# Patient Record
Sex: FEMALE | NOT HISPANIC OR LATINO | Employment: FULL TIME | URBAN - METROPOLITAN AREA
[De-identification: names, ages, dates, MRNs, and addresses within clinical notes are randomized per-mention and may not be internally consistent; named-entity substitution may affect disease eponyms.]

---

## 2023-08-09 ENCOUNTER — HOSPITAL ENCOUNTER (OUTPATIENT)
Dept: DATA CONVERSION | Facility: HOSPITAL | Age: 58
Discharge: HOME | End: 2023-08-09

## 2023-08-09 DIAGNOSIS — R73.01 IMPAIRED FASTING GLUCOSE: ICD-10-CM

## 2023-08-09 DIAGNOSIS — I10 ESSENTIAL (PRIMARY) HYPERTENSION: ICD-10-CM

## 2023-08-09 LAB
ANION GAP SERPL CALCULATED.3IONS-SCNC: 11 MMOL/L (ref 0–19)
BUN SERPL-MCNC: 15 MG/DL (ref 8–25)
BUN/CREAT SERPL: 16.7 RATIO (ref 8–21)
CALCIUM SERPL-MCNC: 9.4 MG/DL (ref 8.5–10.4)
CHLORIDE SERPL-SCNC: 104 MMOL/L (ref 97–107)
CO2 SERPL-SCNC: 28 MMOL/L (ref 24–31)
CREAT SERPL-MCNC: 0.9 MG/DL (ref 0.4–1.6)
GFR SERPL CREATININE-BSD FRML MDRD: 74 ML/MIN/1.73 M2
GLUCOSE SERPL-MCNC: 117 MG/DL (ref 65–99)
HBA1C MFR BLD: 6.5 % (ref 4–6)
POTASSIUM SERPL-SCNC: 4.2 MMOL/L (ref 3.4–5.1)
SODIUM SERPL-SCNC: 143 MMOL/L (ref 133–145)

## 2023-09-10 ENCOUNTER — HOSPITAL ENCOUNTER (OUTPATIENT)
Dept: DATA CONVERSION | Facility: HOSPITAL | Age: 58
Discharge: HOME | End: 2023-09-10
Payer: COMMERCIAL

## 2023-09-10 DIAGNOSIS — R22.0 LOCALIZED SWELLING, MASS AND LUMP, HEAD: ICD-10-CM
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
## 2023-09-10 DIAGNOSIS — T78.40XA ALLERGY, UNSPECIFIED, INITIAL ENCOUNTER: ICD-10-CM

## 2023-09-10 DIAGNOSIS — U07.1 COVID-19: ICD-10-CM

## 2023-09-21 RX ORDER — ATORVASTATIN CALCIUM 20 MG/1
20 TABLET, FILM COATED ORAL DAILY PRN
COMMUNITY
Start: 2023-08-15 | End: 2024-04-08 | Stop reason: SDUPTHER

## 2023-09-21 RX ORDER — CITALOPRAM 20 MG/1
20 TABLET, FILM COATED ORAL DAILY
COMMUNITY
Start: 2023-08-15 | End: 2024-03-19

## 2023-09-21 RX ORDER — LISINOPRIL 5 MG/1
5 TABLET ORAL DAILY
COMMUNITY
Start: 2023-07-29 | End: 2023-11-20 | Stop reason: WASHOUT

## 2023-09-21 RX ORDER — METFORMIN HYDROCHLORIDE 500 MG/1
TABLET, EXTENDED RELEASE ORAL
COMMUNITY
Start: 2023-08-21 | End: 2024-03-15

## 2023-09-21 RX ORDER — LOSARTAN POTASSIUM 25 MG/1
TABLET ORAL
COMMUNITY
Start: 2023-09-08 | End: 2023-10-10 | Stop reason: WASHOUT

## 2023-09-21 RX ORDER — ALENDRONATE SODIUM 70 MG/1
70 TABLET ORAL
COMMUNITY
Start: 2023-03-22

## 2023-10-09 DIAGNOSIS — I10 PRIMARY HYPERTENSION: Primary | ICD-10-CM

## 2023-10-09 RX ORDER — LOSARTAN POTASSIUM 25 MG/1
TABLET ORAL
Qty: 30 TABLET | Refills: 0 | Status: CANCELLED | OUTPATIENT
Start: 2023-10-09

## 2023-10-10 RX ORDER — LOSARTAN POTASSIUM 25 MG/1
25 TABLET ORAL DAILY
Qty: 90 TABLET | Refills: 0 | Status: SHIPPED | OUTPATIENT
Start: 2023-10-10 | End: 2024-01-10

## 2023-11-20 ENCOUNTER — OFFICE VISIT (OUTPATIENT)
Dept: PRIMARY CARE | Facility: CLINIC | Age: 58
End: 2023-11-20
Payer: COMMERCIAL

## 2023-11-20 ENCOUNTER — TELEPHONE (OUTPATIENT)
Dept: PRIMARY CARE | Facility: CLINIC | Age: 58
End: 2023-11-20

## 2023-11-20 ENCOUNTER — LAB (OUTPATIENT)
Dept: LAB | Facility: LAB | Age: 58
End: 2023-11-20
Payer: COMMERCIAL

## 2023-11-20 VITALS
HEART RATE: 91 BPM | DIASTOLIC BLOOD PRESSURE: 90 MMHG | WEIGHT: 143 LBS | OXYGEN SATURATION: 98 % | SYSTOLIC BLOOD PRESSURE: 140 MMHG | BODY MASS INDEX: 24.55 KG/M2

## 2023-11-20 DIAGNOSIS — R53.83 OTHER FATIGUE: Primary | ICD-10-CM

## 2023-11-20 DIAGNOSIS — M25.542 ARTHRALGIA OF BOTH HANDS: ICD-10-CM

## 2023-11-20 DIAGNOSIS — R53.83 OTHER FATIGUE: ICD-10-CM

## 2023-11-20 DIAGNOSIS — M25.541 ARTHRALGIA OF BOTH HANDS: ICD-10-CM

## 2023-11-20 LAB
ALBUMIN SERPL-MCNC: 4.7 G/DL (ref 3.5–5)
ALP BLD-CCNC: 88 U/L (ref 35–125)
ALT SERPL-CCNC: 19 U/L (ref 5–40)
ANION GAP SERPL CALC-SCNC: 15 MMOL/L
AST SERPL-CCNC: 19 U/L (ref 5–40)
BASOPHILS # BLD AUTO: 0.05 X10*3/UL (ref 0–0.1)
BASOPHILS NFR BLD AUTO: 0.7 %
BILIRUB SERPL-MCNC: 1.2 MG/DL (ref 0.1–1.2)
BUN SERPL-MCNC: 14 MG/DL (ref 8–25)
CALCIUM SERPL-MCNC: 9.7 MG/DL (ref 8.5–10.4)
CHLORIDE SERPL-SCNC: 104 MMOL/L (ref 97–107)
CO2 SERPL-SCNC: 26 MMOL/L (ref 24–31)
CREAT SERPL-MCNC: 0.9 MG/DL (ref 0.4–1.6)
EOSINOPHIL # BLD AUTO: 0.07 X10*3/UL (ref 0–0.7)
EOSINOPHIL NFR BLD AUTO: 1 %
ERYTHROCYTE [DISTWIDTH] IN BLOOD BY AUTOMATED COUNT: 13.1 % (ref 11.5–14.5)
GFR SERPL CREATININE-BSD FRML MDRD: 74 ML/MIN/1.73M*2
GLUCOSE SERPL-MCNC: 103 MG/DL (ref 65–99)
HCT VFR BLD AUTO: 39.9 % (ref 36–46)
HGB BLD-MCNC: 13.1 G/DL (ref 12–16)
IMM GRANULOCYTES # BLD AUTO: 0.03 X10*3/UL (ref 0–0.7)
IMM GRANULOCYTES NFR BLD AUTO: 0.4 % (ref 0–0.9)
LYMPHOCYTES # BLD AUTO: 2.31 X10*3/UL (ref 1.2–4.8)
LYMPHOCYTES NFR BLD AUTO: 31.6 %
MCH RBC QN AUTO: 29.5 PG (ref 26–34)
MCHC RBC AUTO-ENTMCNC: 32.8 G/DL (ref 32–36)
MCV RBC AUTO: 90 FL (ref 80–100)
MONOCYTES # BLD AUTO: 0.4 X10*3/UL (ref 0.1–1)
MONOCYTES NFR BLD AUTO: 5.5 %
NEUTROPHILS # BLD AUTO: 4.46 X10*3/UL (ref 1.2–7.7)
NEUTROPHILS NFR BLD AUTO: 60.8 %
NRBC BLD-RTO: 0 /100 WBCS (ref 0–0)
PLATELET # BLD AUTO: 310 X10*3/UL (ref 150–450)
POTASSIUM SERPL-SCNC: 4.1 MMOL/L (ref 3.4–5.1)
PROT SERPL-MCNC: 6.7 G/DL (ref 5.9–7.9)
RBC # BLD AUTO: 4.44 X10*6/UL (ref 4–5.2)
SODIUM SERPL-SCNC: 145 MMOL/L (ref 133–145)
TSH SERPL DL<=0.05 MIU/L-ACNC: 3.25 MIU/L (ref 0.27–4.2)
VIT B12 SERPL-MCNC: 514 PG/ML (ref 211–946)
WBC # BLD AUTO: 7.3 X10*3/UL (ref 4.4–11.3)

## 2023-11-20 PROCEDURE — 36415 COLL VENOUS BLD VENIPUNCTURE: CPT

## 2023-11-20 PROCEDURE — 82607 VITAMIN B-12: CPT

## 2023-11-20 PROCEDURE — 80053 COMPREHEN METABOLIC PANEL: CPT

## 2023-11-20 PROCEDURE — 84443 ASSAY THYROID STIM HORMONE: CPT

## 2023-11-20 PROCEDURE — 85025 COMPLETE CBC W/AUTO DIFF WBC: CPT

## 2023-11-20 RX ORDER — CALCIUM ACETATE 667 MG/1
667 CAPSULE ORAL 3 TIMES DAILY
COMMUNITY

## 2023-11-20 RX ORDER — METHYLPREDNISOLONE 4 MG/1
TABLET ORAL
Qty: 21 TABLET | Refills: 0 | Status: SHIPPED | OUTPATIENT
Start: 2023-11-20 | End: 2023-11-21 | Stop reason: SDUPTHER

## 2023-11-20 ASSESSMENT — PAIN SCALES - GENERAL: PAINLEVEL: 10-WORST PAIN EVER

## 2023-11-20 ASSESSMENT — PATIENT HEALTH QUESTIONNAIRE - PHQ9
2. FEELING DOWN, DEPRESSED OR HOPELESS: NOT AT ALL
SUM OF ALL RESPONSES TO PHQ9 QUESTIONS 1 AND 2: 0
1. LITTLE INTEREST OR PLEASURE IN DOING THINGS: NOT AT ALL

## 2023-11-20 ASSESSMENT — COLUMBIA-SUICIDE SEVERITY RATING SCALE - C-SSRS
6. HAVE YOU EVER DONE ANYTHING, STARTED TO DO ANYTHING, OR PREPARED TO DO ANYTHING TO END YOUR LIFE?: NO
1. IN THE PAST MONTH, HAVE YOU WISHED YOU WERE DEAD OR WISHED YOU COULD GO TO SLEEP AND NOT WAKE UP?: NO
2. HAVE YOU ACTUALLY HAD ANY THOUGHTS OF KILLING YOURSELF?: NO

## 2023-11-20 NOTE — PROGRESS NOTES
Subjective   Patient ID: Mariia Rodarte is a 58 y.o. female who presents for Medication Reaction (Pt would like to discuss her metformin and interaction to plaxovid, pt had swelling. Pt has restarted metformin, pt has noticed pain in hands and feet. ).    HPI  59 yo female here for DM follow up  DM  Started metformin  Stopped it due to paxlovid rxn  When she restarted it, feeling rxn from it  Is ok now doing 500 mg in morning, 1000 mg at night  2. Covid a few months ago  Started paxlovid  Had throat swelling  Had paxlovid in past and didn't react  3. Long covid  Covid in September  B/L joint pain in hands, hips, knees  Fatigue  4. Fatigue  Started after covid infection  5. Elevated BP  Didn't take medication yet this morning  On lisinopril 25 mg    Review of Systems  All pertinent positive symptoms are included in the history of present illness.    All other systems have been reviewed and are negative and noncontributory to this patient's current ailments.     Allergies   Allergen Reactions    Paxlovid [Nirmatrelvir-Ritonavir] Swelling        Immunization History   Administered Date(s) Administered    Pfizer Purple Cap SARS-CoV-2 03/21/2021, 04/11/2021, 12/27/2021       Objective   Vitals:    11/20/23 0946 11/20/23 1207   BP: (!) 142/92 140/90   Pulse: 91    SpO2: 98%    Weight: 64.9 kg (143 lb)        Physical Exam  CONSTITUTIONAL - well nourished, well developed, looks like stated age, in no acute distress  SKIN - normal skin color and pigmentation, normal skin turgor without rash, lesions, or nodules visualized  HEAD - no trauma, normocephalic  EYES - extraocular muscles are intact  ENT - atraumatic  NECK - no neck mass was observed  LUNGS - CTA B/L  CARDIAC - regular rate and regular rhythm, no skipped beats, no murmur appreciated  ABDOMEN - no organomegaly, soft, nontender, nondistended, no guarding/rebound/rigidity  EXTREMITIES - no edema, no deformities  MSK - moves limbs equally  NEUROLOGICAL - normal gait,  normal balance, alert, oriented and no focal signs  PSYCHIATRIC - alert, pleasant and cordial, age-appropriate  IMMUNOLOGIC - no cervical lymphadenopathy     Assessment/Plan   59 yo female here for DM follow up  DM  Start metformin 500 mg in morning, 1000 mg at night  A1C ordered  2. Covid a few months ago  Do not take paxlovid in future  3. Fatigue/joint pain, concern for Long covid  Referral to long covid clinic  4. Fatigue  Lab work  5. Joint pain  Start medrol dose pack  6. Elevated BP  Didn't take medication yet this morning  Continue lisinopril 25 mg  Log Bps at home and return in 1 month if Bps over 130/85    RTC in 3 months for DM follow up

## 2023-11-21 DIAGNOSIS — M25.541 ARTHRALGIA OF BOTH HANDS: ICD-10-CM

## 2023-11-21 DIAGNOSIS — M25.542 ARTHRALGIA OF BOTH HANDS: ICD-10-CM

## 2023-11-21 RX ORDER — METHYLPREDNISOLONE 4 MG/1
TABLET ORAL
Qty: 21 TABLET | Refills: 0 | Status: SHIPPED | OUTPATIENT
Start: 2023-11-21 | End: 2023-11-28

## 2023-11-21 NOTE — TELEPHONE ENCOUNTER
Pt called stating Medrol Dospak was sent to incorrect pharmacy. Needs to go to  in [place]. Pharmacy added

## 2023-11-29 ENCOUNTER — TELEPHONE (OUTPATIENT)
Dept: PRIMARY CARE | Facility: CLINIC | Age: 58
End: 2023-11-29
Payer: COMMERCIAL

## 2023-11-29 DIAGNOSIS — J06.9 UPPER RESPIRATORY TRACT INFECTION, UNSPECIFIED TYPE: Primary | ICD-10-CM

## 2023-11-29 NOTE — TELEPHONE ENCOUNTER
Pt called stating she was seen by  on 11/20 for sick sx and was given medro dose verna. Pt states she is coughing up a lot of phlegm and is discolored. Pt wants to know if there is something over the counter you can recommend such as sudafed?

## 2023-11-30 RX ORDER — AMOXICILLIN AND CLAVULANATE POTASSIUM 875; 125 MG/1; MG/1
875 TABLET, FILM COATED ORAL 2 TIMES DAILY
Qty: 14 TABLET | Refills: 0 | Status: SHIPPED | OUTPATIENT
Start: 2023-11-30 | End: 2023-12-07

## 2023-11-30 NOTE — TELEPHONE ENCOUNTER
7 days of Augmentin sent in. Take with food, and start daily probiotic to avoid GI upset. Schedule follow-up appointment in office if not approving on antibiotic.

## 2023-11-30 NOTE — TELEPHONE ENCOUNTER
Pt called back and states that she is feeling worse. Pt did as recommended and has been taking the mucinex 600mg over the counter. Pt states that she is still having a lot of coughing with phelgm, that it making her have chest pressure. Pt would like to know if she could get antibotics. Pt last seen on 11/20/2023.

## 2023-12-08 ENCOUNTER — ANCILLARY PROCEDURE (OUTPATIENT)
Dept: RADIOLOGY | Facility: CLINIC | Age: 58
End: 2023-12-08
Payer: COMMERCIAL

## 2023-12-08 ENCOUNTER — OFFICE VISIT (OUTPATIENT)
Dept: PRIMARY CARE | Facility: CLINIC | Age: 58
End: 2023-12-08
Payer: COMMERCIAL

## 2023-12-08 VITALS
BODY MASS INDEX: 25.13 KG/M2 | HEART RATE: 79 BPM | OXYGEN SATURATION: 98 % | DIASTOLIC BLOOD PRESSURE: 84 MMHG | SYSTOLIC BLOOD PRESSURE: 126 MMHG | WEIGHT: 146.4 LBS

## 2023-12-08 DIAGNOSIS — R05.8 PRODUCTIVE COUGH: Primary | ICD-10-CM

## 2023-12-08 DIAGNOSIS — R05.8 PRODUCTIVE COUGH: ICD-10-CM

## 2023-12-08 PROCEDURE — 71046 X-RAY EXAM CHEST 2 VIEWS: CPT | Mod: FY

## 2023-12-08 PROCEDURE — 99213 OFFICE O/P EST LOW 20 MIN: CPT | Performed by: PHYSICIAN ASSISTANT

## 2023-12-08 PROCEDURE — 1036F TOBACCO NON-USER: CPT | Performed by: PHYSICIAN ASSISTANT

## 2023-12-08 RX ORDER — METHYLPREDNISOLONE 4 MG/1
TABLET ORAL
Qty: 21 TABLET | Refills: 0 | Status: SHIPPED | OUTPATIENT
Start: 2023-12-08 | End: 2023-12-15

## 2023-12-08 ASSESSMENT — ENCOUNTER SYMPTOMS
FATIGUE: 0
SINUS PRESSURE: 1
HEADACHES: 1
FEVER: 0
WHEEZING: 0
COUGH: 1
SORE THROAT: 0
SINUS PAIN: 1
SHORTNESS OF BREATH: 0
MYALGIAS: 0
RHINORRHEA: 1

## 2023-12-08 ASSESSMENT — COLUMBIA-SUICIDE SEVERITY RATING SCALE - C-SSRS
6. HAVE YOU EVER DONE ANYTHING, STARTED TO DO ANYTHING, OR PREPARED TO DO ANYTHING TO END YOUR LIFE?: NO
2. HAVE YOU ACTUALLY HAD ANY THOUGHTS OF KILLING YOURSELF?: NO
1. IN THE PAST MONTH, HAVE YOU WISHED YOU WERE DEAD OR WISHED YOU COULD GO TO SLEEP AND NOT WAKE UP?: NO

## 2023-12-08 ASSESSMENT — PATIENT HEALTH QUESTIONNAIRE - PHQ9
2. FEELING DOWN, DEPRESSED OR HOPELESS: NOT AT ALL
1. LITTLE INTEREST OR PLEASURE IN DOING THINGS: NOT AT ALL
SUM OF ALL RESPONSES TO PHQ9 QUESTIONS 1 AND 2: 0

## 2023-12-08 ASSESSMENT — PAIN SCALES - GENERAL: PAINLEVEL: 8

## 2023-12-08 NOTE — PROGRESS NOTES
Subjective   Patient ID: Mariia Rodarte is a 58 y.o. female who presents for Nasal Congestion (Pt is here for continued congestion, coughing up green mucus, and stuffy nose, for 15 days  /nr /Pt did a covid test on Monday and Wednesday, which was negative ).    HPI     Sxs ongoing x 15 days. Started w/voice hoarseness and cold sxs. Progressively worsening. Was evaluated here in office and advised to trial mucinex which didn't help. She called back and was given rx for augmentin. On last day today, does not feel that it helped.    Still coughing up green phlegm. Denies dyspnea. Nonsmoker.      Review of Systems   Constitutional:  Negative for fatigue and fever.   HENT:  Positive for congestion, rhinorrhea, sinus pressure and sinus pain. Negative for ear pain and sore throat.    Respiratory:  Positive for cough (productive, yellow-greenish mucus). Negative for shortness of breath and wheezing.    Cardiovascular:  Negative for chest pain.   Musculoskeletal:  Negative for myalgias.   Skin:  Negative for rash.   Neurological:  Positive for headaches.        Objective   /84   Pulse 79   Wt 66.4 kg (146 lb 6.4 oz)   SpO2 98%   BMI 25.13 kg/m²     Physical Exam  Constitutional:       Appearance: Normal appearance.   HENT:      Head: Normocephalic and atraumatic.   Eyes:      Conjunctiva/sclera: Conjunctivae normal.   Cardiovascular:      Rate and Rhythm: Normal rate and regular rhythm.   Pulmonary:      Effort: Pulmonary effort is normal.      Breath sounds: Normal breath sounds. No wheezing.   Musculoskeletal:      Cervical back: Normal range of motion and neck supple.   Skin:     General: Skin is warm and dry.      Findings: No rash.   Neurological:      General: No focal deficit present.      Mental Status: She is alert.         Assessment/Plan   Problem List Items Addressed This Visit    None  Visit Diagnoses         Codes    Productive cough    -  Primary R05.8    Relevant Medications    methylPREDNISolone  (Medrol Dospak) 4 mg tablets    Other Relevant Orders    XR chest 2 views

## 2023-12-12 ENCOUNTER — TELEPHONE (OUTPATIENT)
Dept: PRIMARY CARE | Facility: CLINIC | Age: 58
End: 2023-12-12
Payer: COMMERCIAL

## 2023-12-12 NOTE — TELEPHONE ENCOUNTER
It means she has some mild arthritis in her spine. Nothing to do for it if it is not bothering her. Happy to hear she is better on the steroid.

## 2023-12-12 NOTE — TELEPHONE ENCOUNTER
Pt called stating that she saw the xray results. Pt had a question about it, pt would like to know what it means when it showed that she had degenerative changes of the thoracic spine? However, pt does feel much better being on the steroid pack. Pt has noticed improvement in there congestion and cough.

## 2024-01-09 DIAGNOSIS — I10 PRIMARY HYPERTENSION: ICD-10-CM

## 2024-01-10 RX ORDER — LOSARTAN POTASSIUM 25 MG/1
25 TABLET ORAL DAILY
Qty: 90 TABLET | Refills: 1 | Status: SHIPPED | OUTPATIENT
Start: 2024-01-10 | End: 2024-04-08 | Stop reason: SDUPTHER

## 2024-02-16 PROBLEM — G43.011 INTRACTABLE MIGRAINE WITHOUT AURA AND WITH STATUS MIGRAINOSUS: Status: ACTIVE | Noted: 2024-02-16

## 2024-02-16 PROBLEM — E11.9 TYPE 2 DIABETES MELLITUS WITHOUT COMPLICATION, WITHOUT LONG-TERM CURRENT USE OF INSULIN (MULTI): Status: ACTIVE | Noted: 2024-02-16

## 2024-02-16 PROBLEM — E78.5 HYPERLIPIDEMIA: Status: ACTIVE | Noted: 2024-02-16

## 2024-02-16 PROBLEM — M19.031 PRIMARY OSTEOARTHRITIS OF RIGHT WRIST: Status: ACTIVE | Noted: 2024-02-16

## 2024-02-16 PROBLEM — Z90.710 HISTORY OF HYSTERECTOMY: Status: ACTIVE | Noted: 2024-02-16

## 2024-02-16 PROBLEM — F41.1 GAD (GENERALIZED ANXIETY DISORDER): Status: ACTIVE | Noted: 2024-02-16

## 2024-02-16 PROBLEM — I10 ESSENTIAL HYPERTENSION: Status: ACTIVE | Noted: 2024-02-16

## 2024-02-16 PROBLEM — E55.9 VITAMIN D DEFICIENCY: Status: ACTIVE | Noted: 2024-02-16

## 2024-02-16 PROBLEM — N95.1 FEMALE CLIMACTERIC STATE: Status: ACTIVE | Noted: 2024-02-16

## 2024-03-14 DIAGNOSIS — E11.9 TYPE 2 DIABETES MELLITUS WITHOUT COMPLICATION, WITHOUT LONG-TERM CURRENT USE OF INSULIN (MULTI): Primary | ICD-10-CM

## 2024-03-15 RX ORDER — METFORMIN HYDROCHLORIDE 500 MG/1
TABLET, EXTENDED RELEASE ORAL
Qty: 360 TABLET | Refills: 1 | Status: SHIPPED | OUTPATIENT
Start: 2024-03-15

## 2024-03-19 DIAGNOSIS — F41.1 GAD (GENERALIZED ANXIETY DISORDER): Primary | ICD-10-CM

## 2024-03-19 RX ORDER — CITALOPRAM 20 MG/1
20 TABLET, FILM COATED ORAL DAILY
Qty: 90 TABLET | Refills: 1 | Status: SHIPPED | OUTPATIENT
Start: 2024-03-19

## 2024-04-08 ENCOUNTER — LAB (OUTPATIENT)
Dept: LAB | Facility: LAB | Age: 59
End: 2024-04-08
Payer: COMMERCIAL

## 2024-04-08 ENCOUNTER — OFFICE VISIT (OUTPATIENT)
Dept: PRIMARY CARE | Facility: CLINIC | Age: 59
End: 2024-04-08
Payer: COMMERCIAL

## 2024-04-08 ENCOUNTER — HOSPITAL ENCOUNTER (OUTPATIENT)
Dept: RADIOLOGY | Facility: CLINIC | Age: 59
Discharge: HOME | End: 2024-04-08
Payer: COMMERCIAL

## 2024-04-08 VITALS
OXYGEN SATURATION: 98 % | BODY MASS INDEX: 24.89 KG/M2 | HEART RATE: 94 BPM | WEIGHT: 145 LBS | DIASTOLIC BLOOD PRESSURE: 90 MMHG | SYSTOLIC BLOOD PRESSURE: 130 MMHG

## 2024-04-08 DIAGNOSIS — E11.9 TYPE 2 DIABETES MELLITUS WITHOUT COMPLICATION, WITHOUT LONG-TERM CURRENT USE OF INSULIN (MULTI): ICD-10-CM

## 2024-04-08 DIAGNOSIS — M54.2 NECK PAIN: ICD-10-CM

## 2024-04-08 DIAGNOSIS — I10 PRIMARY HYPERTENSION: ICD-10-CM

## 2024-04-08 DIAGNOSIS — Z12.31 BREAST CANCER SCREENING BY MAMMOGRAM: Primary | ICD-10-CM

## 2024-04-08 DIAGNOSIS — R41.3 SHORT-TERM MEMORY LOSS: ICD-10-CM

## 2024-04-08 DIAGNOSIS — E78.2 MIXED HYPERLIPIDEMIA: Primary | ICD-10-CM

## 2024-04-08 LAB
ALBUMIN SERPL-MCNC: 4.7 G/DL (ref 3.5–5)
ALP BLD-CCNC: 88 U/L (ref 35–125)
ALT SERPL-CCNC: 18 U/L (ref 5–40)
ANION GAP SERPL CALC-SCNC: 12 MMOL/L
AST SERPL-CCNC: 19 U/L (ref 5–40)
BILIRUB SERPL-MCNC: 0.9 MG/DL (ref 0.1–1.2)
BUN SERPL-MCNC: 14 MG/DL (ref 8–25)
CALCIUM SERPL-MCNC: 9.8 MG/DL (ref 8.5–10.4)
CHLORIDE SERPL-SCNC: 104 MMOL/L (ref 97–107)
CHOLEST SERPL-MCNC: 153 MG/DL (ref 133–200)
CHOLEST/HDLC SERPL: 2.2 {RATIO}
CO2 SERPL-SCNC: 27 MMOL/L (ref 24–31)
CREAT SERPL-MCNC: 0.8 MG/DL (ref 0.4–1.6)
CREAT UR-MCNC: 36.5 MG/DL
EGFRCR SERPLBLD CKD-EPI 2021: 84 ML/MIN/1.73M*2
GLUCOSE SERPL-MCNC: 113 MG/DL (ref 65–99)
HDLC SERPL-MCNC: 70 MG/DL
LDLC SERPL CALC-MCNC: 57 MG/DL (ref 65–130)
MICROALBUMIN UR-MCNC: <12 MG/L (ref 0–23)
MICROALBUMIN/CREAT UR: NORMAL MG/G{CREAT}
POC HEMOGLOBIN A1C: 5.7 % (ref 4.2–6.5)
POTASSIUM SERPL-SCNC: 4.3 MMOL/L (ref 3.4–5.1)
PROT SERPL-MCNC: 6.8 G/DL (ref 5.9–7.9)
SODIUM SERPL-SCNC: 143 MMOL/L (ref 133–145)
TRIGL SERPL-MCNC: 131 MG/DL (ref 40–150)

## 2024-04-08 PROCEDURE — 80061 LIPID PANEL: CPT

## 2024-04-08 PROCEDURE — 72050 X-RAY EXAM NECK SPINE 4/5VWS: CPT

## 2024-04-08 PROCEDURE — 3075F SYST BP GE 130 - 139MM HG: CPT | Performed by: PHYSICIAN ASSISTANT

## 2024-04-08 PROCEDURE — 83036 HEMOGLOBIN GLYCOSYLATED A1C: CPT | Performed by: PHYSICIAN ASSISTANT

## 2024-04-08 PROCEDURE — 82043 UR ALBUMIN QUANTITATIVE: CPT

## 2024-04-08 PROCEDURE — 4010F ACE/ARB THERAPY RXD/TAKEN: CPT | Performed by: PHYSICIAN ASSISTANT

## 2024-04-08 PROCEDURE — 1036F TOBACCO NON-USER: CPT | Performed by: PHYSICIAN ASSISTANT

## 2024-04-08 PROCEDURE — 36415 COLL VENOUS BLD VENIPUNCTURE: CPT

## 2024-04-08 PROCEDURE — 3079F DIAST BP 80-89 MM HG: CPT | Performed by: PHYSICIAN ASSISTANT

## 2024-04-08 PROCEDURE — 99214 OFFICE O/P EST MOD 30 MIN: CPT | Performed by: PHYSICIAN ASSISTANT

## 2024-04-08 PROCEDURE — 72050 X-RAY EXAM NECK SPINE 4/5VWS: CPT | Performed by: RADIOLOGY

## 2024-04-08 PROCEDURE — 82570 ASSAY OF URINE CREATININE: CPT

## 2024-04-08 PROCEDURE — 80053 COMPREHEN METABOLIC PANEL: CPT

## 2024-04-08 RX ORDER — DEXTROSE 4 G
1 TABLET,CHEWABLE ORAL DAILY
Qty: 1 EACH | Refills: 0 | Status: SHIPPED | OUTPATIENT
Start: 2024-04-08

## 2024-04-08 RX ORDER — LOSARTAN POTASSIUM 25 MG/1
25 TABLET ORAL DAILY
Qty: 90 TABLET | Refills: 1 | Status: SHIPPED | OUTPATIENT
Start: 2024-04-08

## 2024-04-08 RX ORDER — LANCETS 26 GAUGE
1 EACH MISCELLANEOUS DAILY
Qty: 100 EACH | Refills: 1 | Status: SHIPPED | OUTPATIENT
Start: 2024-04-08 | End: 2024-10-25

## 2024-04-08 RX ORDER — ATORVASTATIN CALCIUM 20 MG/1
20 TABLET, FILM COATED ORAL DAILY PRN
Qty: 90 TABLET | Refills: 1 | Status: SHIPPED | OUTPATIENT
Start: 2024-04-08

## 2024-04-08 RX ORDER — BLOOD SUGAR DIAGNOSTIC
1 STRIP MISCELLANEOUS DAILY
Qty: 100 STRIP | Refills: 1 | Status: SHIPPED | OUTPATIENT
Start: 2024-04-08 | End: 2024-07-07

## 2024-04-08 ASSESSMENT — COLUMBIA-SUICIDE SEVERITY RATING SCALE - C-SSRS
2. HAVE YOU ACTUALLY HAD ANY THOUGHTS OF KILLING YOURSELF?: NO
1. IN THE PAST MONTH, HAVE YOU WISHED YOU WERE DEAD OR WISHED YOU COULD GO TO SLEEP AND NOT WAKE UP?: NO
6. HAVE YOU EVER DONE ANYTHING, STARTED TO DO ANYTHING, OR PREPARED TO DO ANYTHING TO END YOUR LIFE?: NO

## 2024-04-08 ASSESSMENT — ENCOUNTER SYMPTOMS
SHORTNESS OF BREATH: 0
FATIGUE: 0
ABDOMINAL PAIN: 0
FEVER: 0

## 2024-04-08 ASSESSMENT — PAIN SCALES - GENERAL: PAINLEVEL: 0-NO PAIN

## 2024-04-08 NOTE — PROGRESS NOTES
Subjective   Patient ID: Mariia Rodarte is a 59 y.o. female who presents for A1c check  (Pt is here for A1c check / nr), Breast Cancer Screening (Pt would like a mammogram order ), Medication Reaction (Pt would like to discuss getting a meter to check her blood sugars, due to having some dizziness / nr), and Memory Loss (Pt has noticed having some short term memory loss, for the last month / nr).    HPI:   DM - A1c 6.5 --> 5.7. managed w/metformin 2000mg daily. +statin, ARB. PNA vaccine 3/2022. Occasionally gets sxs of lightheadedness, requesting glucometer. Due for eye exam - requesting referral.    Short term memory loss - worsening over the past month. Has difficulty remembering where she is/what she's doing. Lasts 1-2 minutes at a time. +FamHx dementia.    Neck pain - ongoing x 6mos, worsening. Admits to decreased ROM. Denies paresthesias.      Review of Systems   Constitutional:  Negative for fatigue and fever.   Respiratory:  Negative for shortness of breath.    Cardiovascular:  Negative for chest pain.   Gastrointestinal:  Negative for abdominal pain.   Skin:  Negative for rash.       Objective   /90   Pulse 94   Wt 65.8 kg (145 lb)   SpO2 98%   BMI 24.89 kg/m²     Physical Exam  Constitutional:       Appearance: Normal appearance.   HENT:      Head: Normocephalic and atraumatic.   Eyes:      Extraocular Movements: Extraocular movements intact.      Conjunctiva/sclera: Conjunctivae normal.   Cardiovascular:      Rate and Rhythm: Normal rate and regular rhythm.      Pulses:           Dorsalis pedis pulses are 2+ on the right side and 2+ on the left side.      Heart sounds: Normal heart sounds.   Pulmonary:      Effort: Pulmonary effort is normal.      Breath sounds: Normal breath sounds. No wheezing or rhonchi.   Musculoskeletal:      Cervical back: Normal range of motion and neck supple.   Feet:      Right foot:      Protective Sensation: 4 sites tested.  4 sites sensed.      Skin integrity: Skin  integrity normal.      Toenail Condition: Right toenails are normal.      Left foot:      Protective Sensation: 4 sites tested.  4 sites sensed.      Skin integrity: Skin integrity normal.      Toenail Condition: Left toenails are normal.   Skin:     General: Skin is warm.      Findings: No rash.   Neurological:      General: No focal deficit present.      Mental Status: She is alert.         Assessment/Plan   Problem List Items Addressed This Visit             ICD-10-CM    Type 2 diabetes mellitus without complication, without long-term current use of insulin (CMS/[facility]) E11.9    Relevant Medications    Autolet (Accu-Chek FastClix Lancing Dev) lancing device    blood-glucose meter (Accu-Chek Guide Glucose Meter) misc    blood sugar diagnostic (Accu-Chek Guide test strips) strip    Other Relevant Orders    Referral to Ophthalmology    Lipid Panel    Comprehensive Metabolic Panel    Albumin , Urine Random    POCT glycosylated hemoglobin (Hb A1C) manually resulted (Completed)     Other Visit Diagnoses         Codes    Breast cancer screening by mammogram    -  Primary Z12.31    Relevant Orders    BI mammo bilateral screening tomosynthesis    Short-term memory loss     R41.3    Relevant Orders    Referral to Neurology    Neck pain     M54.2    Relevant Orders    XR cervical spine complete 4-5 views          >30 mins spent on pt encounter

## 2024-04-17 ENCOUNTER — HOSPITAL ENCOUNTER (OUTPATIENT)
Dept: RADIOLOGY | Facility: CLINIC | Age: 59
Discharge: HOME | End: 2024-04-17
Payer: COMMERCIAL

## 2024-04-17 VITALS — BODY MASS INDEX: 24.41 KG/M2 | WEIGHT: 143 LBS | HEIGHT: 64 IN

## 2024-04-17 DIAGNOSIS — Z12.31 BREAST CANCER SCREENING BY MAMMOGRAM: ICD-10-CM

## 2024-04-17 PROCEDURE — 77063 BREAST TOMOSYNTHESIS BI: CPT

## 2024-04-17 PROCEDURE — 77067 SCR MAMMO BI INCL CAD: CPT | Performed by: STUDENT IN AN ORGANIZED HEALTH CARE EDUCATION/TRAINING PROGRAM

## 2024-04-17 PROCEDURE — 77063 BREAST TOMOSYNTHESIS BI: CPT | Performed by: STUDENT IN AN ORGANIZED HEALTH CARE EDUCATION/TRAINING PROGRAM

## 2024-05-20 ENCOUNTER — OFFICE VISIT (OUTPATIENT)
Dept: PRIMARY CARE | Facility: CLINIC | Age: 59
End: 2024-05-20
Payer: COMMERCIAL

## 2024-05-20 VITALS
WEIGHT: 142 LBS | HEART RATE: 108 BPM | SYSTOLIC BLOOD PRESSURE: 134 MMHG | DIASTOLIC BLOOD PRESSURE: 86 MMHG | HEIGHT: 64 IN | BODY MASS INDEX: 24.24 KG/M2 | OXYGEN SATURATION: 97 %

## 2024-05-20 DIAGNOSIS — M70.62 GREATER TROCHANTERIC BURSITIS OF LEFT HIP: Primary | ICD-10-CM

## 2024-05-20 DIAGNOSIS — M54.42 ACUTE LEFT-SIDED LOW BACK PAIN WITH LEFT-SIDED SCIATICA: ICD-10-CM

## 2024-05-20 DIAGNOSIS — M76.32 ILIOTIBIAL BAND SYNDROME, LEFT: ICD-10-CM

## 2024-05-20 PROCEDURE — 99213 OFFICE O/P EST LOW 20 MIN: CPT

## 2024-05-20 PROCEDURE — 3079F DIAST BP 80-89 MM HG: CPT

## 2024-05-20 PROCEDURE — 3075F SYST BP GE 130 - 139MM HG: CPT

## 2024-05-20 PROCEDURE — 1036F TOBACCO NON-USER: CPT

## 2024-05-20 PROCEDURE — 3062F POS MACROALBUMINURIA REV: CPT

## 2024-05-20 PROCEDURE — 3048F LDL-C <100 MG/DL: CPT

## 2024-05-20 PROCEDURE — 4010F ACE/ARB THERAPY RXD/TAKEN: CPT

## 2024-05-20 RX ORDER — METHYLPREDNISOLONE 4 MG/1
TABLET ORAL
Qty: 21 TABLET | Refills: 0 | Status: SHIPPED | OUTPATIENT
Start: 2024-05-20 | End: 2024-05-27

## 2024-05-20 ASSESSMENT — ENCOUNTER SYMPTOMS
NUMBNESS: 1
BACK PAIN: 1
FEVER: 0
ARTHRALGIAS: 1
CHILLS: 0

## 2024-05-20 ASSESSMENT — PAIN SCALES - GENERAL: PAINLEVEL: 8

## 2024-05-20 NOTE — PROGRESS NOTES
"Subjective   Patient ID: Mariia Rodarte is a 59 y.o. female who presents for Hip Pain (Pt c/o lt hip and leg pain for over a month /la).    Left outer hip pain 1-2 montsh progressively worsening. Radiates into back of thigh, feels like \"constant munira horse\" and also \"dull painful ache in bone.\" Patient exacerbated with sitting for long periods, walking, and sleeping on left side, crossing left leg. Associated low back pain, also occasional has radiation and numbness at back of ankle. Patient has been taking Tylenol which takes \"edge off\" which takes pain temporarily.     Denies trauma, changes of physical activity.                Review of Systems   Constitutional:  Negative for chills and fever.   Musculoskeletal:  Positive for arthralgias, back pain and gait problem.   Neurological:  Positive for numbness.       Objective   /86   Pulse 108   Ht 1.626 m (5' 4\")   Wt 64.4 kg (142 lb)   SpO2 97%   BMI 24.37 kg/m²     Physical Exam  Constitutional:       Appearance: Normal appearance.   Pulmonary:      Effort: Pulmonary effort is normal.   Musculoskeletal:      Thoracic back: No bony tenderness.      Lumbar back: Tenderness and bony tenderness present. Positive left straight leg raise test. Negative right straight leg raise test.        Back:       Right hip: Normal.      Left hip: Tenderness present. Decreased range of motion.      Comments: TTP throughout lower left back, limited hip flexion and extension of left side, +left figure four test, TTP of left greater trochanteric bursa.    Skin:     Findings: No rash.   Neurological:      Mental Status: She is alert.      Gait: Gait abnormal.      Comments: Favoring right leg when walking, limping   Psychiatric:         Mood and Affect: Mood and affect normal.         Assessment/Plan   Diagnoses and all orders for this visit:  Greater trochanteric bursitis of left hip  -     methylPREDNISolone (Medrol Dospak) 4 mg tablets; Take as directed on " package.  Acute left-sided low back pain with left-sided sciatica  -     methylPREDNISolone (Medrol Dospak) 4 mg tablets; Take as directed on package.  Iliotibial band syndrome, left  -Given printout of stretches for both bursitis and IT band with steroid. If not improving in the next 2 weeks, can call and schedule hip bursitis injection.

## 2024-05-28 ENCOUNTER — PATIENT MESSAGE (OUTPATIENT)
Dept: PRIMARY CARE | Facility: CLINIC | Age: 59
End: 2024-05-28
Payer: COMMERCIAL

## 2024-05-28 DIAGNOSIS — F43.10 PTSD (POST-TRAUMATIC STRESS DISORDER): Primary | ICD-10-CM

## 2024-06-13 ENCOUNTER — APPOINTMENT (OUTPATIENT)
Dept: BEHAVIORAL HEALTH | Facility: CLINIC | Age: 59
End: 2024-06-13
Payer: COMMERCIAL

## 2024-07-03 ENCOUNTER — APPOINTMENT (OUTPATIENT)
Dept: BEHAVIORAL HEALTH | Facility: CLINIC | Age: 59
End: 2024-07-03
Payer: COMMERCIAL

## 2024-08-14 ENCOUNTER — TELEPHONE (OUTPATIENT)
Dept: PRIMARY CARE | Facility: CLINIC | Age: 59
End: 2024-08-14
Payer: COMMERCIAL

## 2024-08-14 DIAGNOSIS — R19.5 ABNORMAL STOOLS: Primary | ICD-10-CM

## 2024-08-14 NOTE — TELEPHONE ENCOUNTER
>Tyler Coverage<     Pt called stating when she has bowel movements - she can see whole pills in her stool - Metformin 500mg tablets - she takes x2 in the morning -- x2 pills in the evening evening - She states this has been happening for about x2 weeks -- Please Advise

## 2024-08-15 NOTE — TELEPHONE ENCOUNTER
Pt called stating she cannot get into GI until 12/20/24    >She would like to know what she should do in the meantime  >She's concerned her medication is not working if it is not dissolving   >Should she continue taking medication    Unna Boot Text: An Unna boot was placed to help immobilize the limb and facilitate more rapid healing.

## 2024-08-15 NOTE — TELEPHONE ENCOUNTER
Called pt. States sxs started 1mo ago. Metformin tablets coming out whole in stool. No issues w/other medications. Having abdominal pain, bright red blood in the toilet. Has not noticed any hemorrhoids. Has upcoming appt w/GI next Friday. Advised to hold metformin for now.

## 2024-08-15 NOTE — TELEPHONE ENCOUNTER
Called and spoke with pt, who will call Dr. Monterroso. Pt would like to know if she should change medications, due to her metformin coming out as whole pills. Pt is concern that the medication is not working to control her blood sugar.

## 2024-08-23 ENCOUNTER — HOSPITAL ENCOUNTER (OUTPATIENT)
Dept: RADIOLOGY | Facility: HOSPITAL | Age: 59
Discharge: HOME | End: 2024-08-23
Payer: COMMERCIAL

## 2024-08-23 ENCOUNTER — LAB REQUISITION (OUTPATIENT)
Dept: LAB | Facility: HOSPITAL | Age: 59
End: 2024-08-23
Payer: COMMERCIAL

## 2024-08-23 ENCOUNTER — LAB (OUTPATIENT)
Dept: LAB | Facility: LAB | Age: 59
End: 2024-08-23
Payer: COMMERCIAL

## 2024-08-23 DIAGNOSIS — R10.30 LOWER ABDOMINAL PAIN, UNSPECIFIED: ICD-10-CM

## 2024-08-23 DIAGNOSIS — R10.30 LOWER ABDOMINAL PAIN, UNSPECIFIED: Primary | ICD-10-CM

## 2024-08-23 LAB
CREAT SERPL-MCNC: 0.8 MG/DL (ref 0.4–1.6)
EGFRCR SERPLBLD CKD-EPI 2021: 84 ML/MIN/1.73M*2
ERYTHROCYTE [DISTWIDTH] IN BLOOD BY AUTOMATED COUNT: 12.8 % (ref 11.5–14.5)
FERRITIN SERPL-MCNC: 71 NG/ML (ref 13–150)
HCT VFR BLD AUTO: 40.3 % (ref 36–46)
HGB BLD-MCNC: 13 G/DL (ref 12–16)
IRON SATN MFR SERPL: 24 % (ref 12–50)
IRON SERPL-MCNC: 78 UG/DL (ref 30–160)
MCH RBC QN AUTO: 29.3 PG (ref 26–34)
MCHC RBC AUTO-ENTMCNC: 32.3 G/DL (ref 32–36)
MCV RBC AUTO: 91 FL (ref 80–100)
NRBC BLD-RTO: 0 /100 WBCS (ref 0–0)
PLATELET # BLD AUTO: 266 X10*3/UL (ref 150–450)
RBC # BLD AUTO: 4.44 X10*6/UL (ref 4–5.2)
TIBC SERPL-MCNC: 319 UG/DL (ref 228–428)
TSH SERPL DL<=0.05 MIU/L-ACNC: 1.87 MIU/L (ref 0.27–4.2)
UIBC SERPL-MCNC: 241 UG/DL (ref 110–370)
WBC # BLD AUTO: 6.5 X10*3/UL (ref 4.4–11.3)

## 2024-08-23 PROCEDURE — 82565 ASSAY OF CREATININE: CPT

## 2024-08-23 PROCEDURE — 84443 ASSAY THYROID STIM HORMONE: CPT

## 2024-08-23 PROCEDURE — 36415 COLL VENOUS BLD VENIPUNCTURE: CPT

## 2024-08-23 PROCEDURE — 82728 ASSAY OF FERRITIN: CPT

## 2024-08-23 PROCEDURE — 74177 CT ABD & PELVIS W/CONTRAST: CPT

## 2024-08-23 PROCEDURE — 83550 IRON BINDING TEST: CPT

## 2024-08-23 PROCEDURE — 85027 COMPLETE CBC AUTOMATED: CPT

## 2024-08-23 PROCEDURE — 83540 ASSAY OF IRON: CPT

## 2024-08-23 PROCEDURE — 2550000001 HC RX 255 CONTRASTS

## 2024-09-16 ENCOUNTER — HOSPITAL ENCOUNTER (OUTPATIENT)
Dept: RADIOLOGY | Facility: CLINIC | Age: 59
Discharge: HOME | End: 2024-09-16
Payer: COMMERCIAL

## 2024-09-16 ENCOUNTER — APPOINTMENT (OUTPATIENT)
Dept: ORTHOPEDIC SURGERY | Facility: CLINIC | Age: 59
End: 2024-09-16
Payer: COMMERCIAL

## 2024-09-16 DIAGNOSIS — M19.031 OSTEOARTHRITIS OF RIGHT WRIST, UNSPECIFIED OSTEOARTHRITIS TYPE: Primary | ICD-10-CM

## 2024-09-16 DIAGNOSIS — E11.9 TYPE 2 DIABETES MELLITUS WITHOUT COMPLICATION, WITHOUT LONG-TERM CURRENT USE OF INSULIN (MULTI): ICD-10-CM

## 2024-09-16 DIAGNOSIS — M19.031 OSTEOARTHRITIS OF RIGHT WRIST, UNSPECIFIED OSTEOARTHRITIS TYPE: ICD-10-CM

## 2024-09-16 PROCEDURE — 3048F LDL-C <100 MG/DL: CPT | Performed by: ORTHOPAEDIC SURGERY

## 2024-09-16 PROCEDURE — 73130 X-RAY EXAM OF HAND: CPT | Mod: RT

## 2024-09-16 PROCEDURE — 1036F TOBACCO NON-USER: CPT | Performed by: ORTHOPAEDIC SURGERY

## 2024-09-16 PROCEDURE — 3062F POS MACROALBUMINURIA REV: CPT | Performed by: ORTHOPAEDIC SURGERY

## 2024-09-16 PROCEDURE — 99213 OFFICE O/P EST LOW 20 MIN: CPT | Performed by: ORTHOPAEDIC SURGERY

## 2024-09-16 PROCEDURE — 4010F ACE/ARB THERAPY RXD/TAKEN: CPT | Performed by: ORTHOPAEDIC SURGERY

## 2024-09-16 PROCEDURE — 73130 X-RAY EXAM OF HAND: CPT | Mod: RIGHT SIDE | Performed by: RADIOLOGY

## 2024-09-16 RX ORDER — METFORMIN HYDROCHLORIDE 500 MG/1
1000 TABLET, EXTENDED RELEASE ORAL
Qty: 360 TABLET | Refills: 3 | Status: SHIPPED | OUTPATIENT
Start: 2024-09-16

## 2024-09-16 RX ORDER — CEFAZOLIN SODIUM 2 G/100ML
2 INJECTION, SOLUTION INTRAVENOUS ONCE
OUTPATIENT
Start: 2024-09-16 | End: 2024-09-16

## 2024-09-16 RX ORDER — SODIUM CHLORIDE, SODIUM LACTATE, POTASSIUM CHLORIDE, CALCIUM CHLORIDE 600; 310; 30; 20 MG/100ML; MG/100ML; MG/100ML; MG/100ML
20 INJECTION, SOLUTION INTRAVENOUS CONTINUOUS
OUTPATIENT
Start: 2024-09-16

## 2024-09-16 ASSESSMENT — PAIN SCALES - GENERAL: PAINLEVEL_OUTOF10: 9

## 2024-09-16 ASSESSMENT — PAIN - FUNCTIONAL ASSESSMENT: PAIN_FUNCTIONAL_ASSESSMENT: 0-10

## 2024-09-16 NOTE — TELEPHONE ENCOUNTER
[facility] called stating the Pt should be taking Metformin 2 tab twice a day.  Pt last seen 5/20/2024

## 2024-09-17 PROBLEM — M19.031 OSTEOARTHRITIS OF RIGHT WRIST: Status: ACTIVE | Noted: 2024-09-16

## 2024-09-17 ASSESSMENT — ENCOUNTER SYMPTOMS
CHILLS: 0
FATIGUE: 0
WHEEZING: 0
JOINT SWELLING: 1
FEVER: 0
TROUBLE SWALLOWING: 0
ARTHRALGIAS: 1
SHORTNESS OF BREATH: 0
SINUS PRESSURE: 0
COLOR CHANGE: 0

## 2024-09-17 NOTE — PROGRESS NOTES
Reason for Appointment  Chief Complaint   Patient presents with    Right Hand - Pain     History of Present Illness  Patient is a 59 y.o. female here today for follow-up evaluation of continued right thumb pain.  She has had injections in the past for severe CMC arthritis, we have discussed surgery previously and she is ready to proceed.  She has Comfort Cool braces that she wears with activity that gave her minimal relief.  Updated x-rays taken today are reviewed and show severe CMC DJD.  She is not having any numbness or tingling in the hand.  No other changes in her past medical history, allergies, or medications.    History reviewed. No pertinent past medical history.    Past Surgical History:   Procedure Laterality Date    CARDIAC VALVE REPLACEMENT      HYSTERECTOMY         Medication Documentation Review Audit       Reviewed by Celia James PA-C (Physician Assistant) on 09/17/24 at 0815      Medication Order Taking? Sig Documenting Provider Last Dose Status   alendronate (Fosamax) 70 mg tablet [id number] Yes Take 1 tablet (70 mg) by mouth every 7 days. 30 minutes before the first food beverage or medicine of the day with plain water Historical Provider, MD Taking Active   atorvastatin (Lipitor) 20 mg tablet [id number] Yes Take 1 tablet (20 mg) by mouth once daily as needed (cholesterol). for cholesterol Caterina Scott PA-C Taking Active   Autolet (Accu-Chek FastClix Lancing Dev) lancing device [id number] Yes 1 each once daily. Use as instructed Caterina Scott PA-C Taking Active   blood-glucose meter (Accu-Chek Guide Glucose Meter) misc [id number] Yes 1 each by in vitro route once daily. Caterina Scott PA-C Taking Active   calcium acetate (Phoslo) 667 mg capsule [id number] Yes Take 1 capsule (667 mg) by mouth 3 times a day. Historical Provider, MD Taking Active   citalopram (CeleXA) 20 mg tablet [id number] Yes TAKE ONE TABLET BY MOUTH DAILY Caterina Scott PA-C Taking Active   losartan (Cozaar) 25 mg  tablet [id number] Yes Take 1 tablet (25 mg) by mouth once daily. Caterina E BHAVNA Scott Taking Active     Discontinued 09/16/24 1215   metFORMIN  mg 24 hr tablet [id number] Yes Take 2 tablets (1,000 mg) by mouth 2 times daily (morning and late afternoon). Do not crush, chew, or split. Caterina KENYA BHAVNA Soctt Taking Active                    Allergies   Allergen Reactions    Paxlovid [Nirmatrelvir-Ritonavir] Swelling    Naproxen Hives and Rash     AND STOMACH ACHE.       Review of Systems   Constitutional:  Negative for chills, fatigue and fever.   HENT:  Negative for nosebleeds, sinus pressure and trouble swallowing.    Respiratory:  Negative for shortness of breath and wheezing.    Cardiovascular:  Negative for chest pain and leg swelling.   Musculoskeletal:  Positive for arthralgias and joint swelling.   Skin:  Negative for color change and pallor.     Exam   On exam left hand shows swelling over the CMC joint with scattered mild distal digit DJD.  She has severe tenderness over the right thumb CMC joint and painful thumb motion.  Good digital motion otherwise with no triggering.  Good pulses and sensation in the upper extremity.    Assessment   Encounter Diagnosis   Name Primary?    Osteoarthritis of right wrist, unspecified osteoarthritis type Yes       Plan   We discussed operative treatment today, she understands the risks of surge including nerve, artery, tendon damage, infection, continued pain, need for future surgery and the lengthy recovery time needed.  She can call and schedule a right thumb CMC arthroplasty with FCR transfer

## 2024-09-18 DIAGNOSIS — M19.031 OSTEOARTHRITIS OF RIGHT WRIST, UNSPECIFIED OSTEOARTHRITIS TYPE: ICD-10-CM

## 2024-09-19 ENCOUNTER — PREP FOR PROCEDURE (OUTPATIENT)
Dept: ORTHOPEDIC SURGERY | Facility: HOSPITAL | Age: 59
End: 2024-09-19
Payer: COMMERCIAL

## 2024-09-27 ENCOUNTER — PRE-ADMISSION TESTING (OUTPATIENT)
Dept: PREADMISSION TESTING | Facility: HOSPITAL | Age: 59
End: 2024-09-27
Payer: COMMERCIAL

## 2024-09-27 VITALS
HEIGHT: 64 IN | WEIGHT: 141 LBS | BODY MASS INDEX: 24.07 KG/M2 | HEART RATE: 92 BPM | TEMPERATURE: 98.4 F | DIASTOLIC BLOOD PRESSURE: 87 MMHG | RESPIRATION RATE: 16 BRPM | OXYGEN SATURATION: 97 % | SYSTOLIC BLOOD PRESSURE: 130 MMHG

## 2024-09-27 DIAGNOSIS — M19.031 OSTEOARTHRITIS OF RIGHT WRIST, UNSPECIFIED OSTEOARTHRITIS TYPE: ICD-10-CM

## 2024-09-27 DIAGNOSIS — Z01.818 PREOP TESTING: Primary | ICD-10-CM

## 2024-09-27 LAB
ANION GAP SERPL CALCULATED.3IONS-SCNC: 13 MMOL/L (ref 10–20)
BUN SERPL-MCNC: 18 MG/DL (ref 6–23)
CALCIUM SERPL-MCNC: 9.7 MG/DL (ref 8.6–10.3)
CHLORIDE SERPL-SCNC: 103 MMOL/L (ref 98–107)
CO2 SERPL-SCNC: 28 MMOL/L (ref 21–32)
CREAT SERPL-MCNC: 0.87 MG/DL (ref 0.5–1.05)
EGFRCR SERPLBLD CKD-EPI 2021: 76 ML/MIN/1.73M*2
EST. AVERAGE GLUCOSE BLD GHB EST-MCNC: 120 MG/DL
GLUCOSE SERPL-MCNC: 115 MG/DL (ref 74–99)
HBA1C MFR BLD: 5.8 %
POTASSIUM SERPL-SCNC: 4.3 MMOL/L (ref 3.5–5.3)
SODIUM SERPL-SCNC: 140 MMOL/L (ref 136–145)

## 2024-09-27 PROCEDURE — 99203 OFFICE O/P NEW LOW 30 MIN: CPT | Performed by: NURSE PRACTITIONER

## 2024-09-27 PROCEDURE — 93010 ELECTROCARDIOGRAM REPORT: CPT | Performed by: INTERNAL MEDICINE

## 2024-09-27 PROCEDURE — 93005 ELECTROCARDIOGRAM TRACING: CPT

## 2024-09-27 PROCEDURE — 87081 CULTURE SCREEN ONLY: CPT | Mod: TRILAB,WESLAB

## 2024-09-27 PROCEDURE — 83036 HEMOGLOBIN GLYCOSYLATED A1C: CPT | Mod: TRILAB,WESLAB

## 2024-09-27 PROCEDURE — 82374 ASSAY BLOOD CARBON DIOXIDE: CPT

## 2024-09-27 PROCEDURE — 36415 COLL VENOUS BLD VENIPUNCTURE: CPT

## 2024-09-27 RX ORDER — CHLORHEXIDINE GLUCONATE ORAL RINSE 1.2 MG/ML
SOLUTION DENTAL
Qty: 473 ML | Refills: 0 | Status: SHIPPED | OUTPATIENT
Start: 2024-09-27 | End: 2024-10-04

## 2024-09-27 ASSESSMENT — DUKE ACTIVITY SCORE INDEX (DASI)
TOTAL_SCORE: 50.7
CAN YOU CLIMB A FLIGHT OF STAIRS OR WALK UP A HILL: YES
CAN YOU WALK A BLOCK OR TWO ON LEVEL GROUND: YES
DASI METS SCORE: 9
CAN YOU TAKE CARE OF YOURSELF (EAT, DRESS, BATHE, OR USE TOILET): YES
CAN YOU DO LIGHT WORK AROUND THE HOUSE LIKE DUSTING OR WASHING DISHES: YES
CAN YOU PARTICIPATE IN STRENOUS SPORTS LIKE SWIMMING, SINGLES TENNIS, FOOTBALL, BASKETBALL, OR SKIING: NO
CAN YOU DO YARD WORK LIKE RAKING LEAVES, WEEDING OR PUSHING A MOWER: YES
CAN YOU DO MODERATE WORK AROUND THE HOUSE LIKE VACUUMING, SWEEPING FLOORS OR CARRYING GROCERIES: YES
CAN YOU DO HEAVY WORK AROUND THE HOUSE LIKE SCRUBBING FLOORS OR LIFTING AND MOVING HEAVY FURNITURE: YES
CAN YOU HAVE SEXUAL RELATIONS: YES
CAN YOU WALK INDOORS, SUCH AS AROUND YOUR HOUSE: YES
CAN YOU PARTICIPATE IN MODERATE RECREATIONAL ACTIVITIES LIKE GOLF, BOWLING, DANCING, DOUBLES TENNIS OR THROWING A BASEBALL OR FOOTBALL: YES
CAN YOU RUN A SHORT DISTANCE: YES

## 2024-09-27 ASSESSMENT — ENCOUNTER SYMPTOMS
PSYCHIATRIC NEGATIVE: 1
CARDIOVASCULAR NEGATIVE: 1
NEUROLOGICAL NEGATIVE: 1
ENDOCRINE NEGATIVE: 1
ALLERGIC/IMMUNOLOGIC NEGATIVE: 1
EYES NEGATIVE: 1
RESPIRATORY NEGATIVE: 1
HEMATOLOGIC/LYMPHATIC NEGATIVE: 1
GASTROINTESTINAL NEGATIVE: 1
CONSTITUTIONAL NEGATIVE: 1

## 2024-09-27 NOTE — H&P (VIEW-ONLY)
"CPM/PAT Evaluation       Name: Mariia Rodarte (Mariia Rodarte \"Calli\")  /Age: 1965/59 y.o.     In-Person       Chief Complaint: right thumb pain     HPI    Pt is a 59 year old female with right thumb pain. Pt reports she has had right thumb pain for several years that continues to worsen. Pt was diagnosed with severe CMC arthritis. Pt stated she tried cortisone injections in the past with no improvement of the pain. Pt reports she experiences constant right thumb pain that is aggravated with any right thumb movement. Pt managed the pain with Aleve, wearing a right thumb support brace and avoiding use of her right hand.  Pt was examined by her surgeon and has been scheduled for  right thumb CMC arthroplasty with FCR transfer. Pt denies CP, SOB, or dizziness.    Past Medical History:   Diagnosis Date    Anxiety     Hyperlipidemia     Hypertension     Osteoporosis     Type 2 diabetes mellitus (Multi)      Past Surgical History:   Procedure Laterality Date    COLONOSCOPY      HYSTERECTOMY      partial hysterectomy     Social History     Tobacco Use    Smoking status: Former     Current packs/day: 0.00     Average packs/day: 0.3 packs/day for 20.0 years (5.0 ttl pk-yrs)     Types: Cigarettes     Start date: 2004     Quit date: 2024     Years since quittin.4    Smokeless tobacco: Never    Tobacco comments:     4 CIGARETTES DAILY IN PAST   Substance Use Topics    Alcohol use: Yes     Alcohol/week: 3.0 standard drinks of alcohol     Types: 3 Cans of beer per week     Social History     Substance and Sexual Activity   Drug Use Never       Patient Sexual activity questions deferred to the physician.    Family History   Problem Relation Name Age of Onset    Hypertension Mother      Cancer Mother      Diabetes Father      Cancer Father      Breast cancer Maternal Grandmother      Cancer Paternal Grandfather         Allergies   Allergen Reactions    Paxlovid [Nirmatrelvir-Ritonavir] Anaphylaxis and Swelling " "    FACE, LIPS AND THROAT STARTED TO CLOSE. CAME TO ER    Naproxen Hives and Rash     AND STOMACH ACHE.     Current Outpatient Medications   Medication Sig Dispense Refill    calcium acetate (Phoslo) 667 mg capsule Take 1 capsule (667 mg) by mouth 3 times a day.      alendronate (Fosamax) 70 mg tablet Take 1 tablet (70 mg) by mouth every 7 days. 30 minutes before the first food beverage or medicine of the day with plain water  SATURDAYS      atorvastatin (Lipitor) 20 mg tablet Take 1 tablet (20 mg) by mouth once daily as needed (cholesterol). for cholesterol (Patient taking differently: Take 1 tablet (20 mg) by mouth once daily. for cholesterol) 90 tablet 1    Autolet (Accu-Chek FastClix Lancing Dev) lancing device 1 each once daily. Use as instructed 100 each 1    blood-glucose meter (Accu-Chek Guide Glucose Meter) misc 1 each by in vitro route once daily. 1 each 0    chlorhexidine (Peridex) 0.12 % solution Use as directed. 473 mL 0    citalopram (CeleXA) 20 mg tablet TAKE ONE TABLET BY MOUTH DAILY 90 tablet 1    losartan (Cozaar) 25 mg tablet Take 1 tablet (25 mg) by mouth once daily. 90 tablet 1    metFORMIN  mg 24 hr tablet Take 2 tablets (1,000 mg) by mouth 2 times daily (morning and late afternoon). Do not crush, chew, or split. 360 tablet 3     No current facility-administered medications for this visit.     Review of Systems   Constitutional: Negative.    HENT: Negative.     Eyes: Negative.    Respiratory: Negative.     Cardiovascular: Negative.    Gastrointestinal: Negative.    Endocrine: Negative.    Genitourinary: Negative.    Musculoskeletal:         Right thumb pain with ROM   Skin: Negative.    Allergic/Immunologic: Negative.    Neurological: Negative.    Hematological: Negative.    Psychiatric/Behavioral: Negative.       /87   Pulse 92   Temp 36.9 °C (98.4 °F) (Temporal)   Resp 16   Ht 1.626 m (5' 4\")   Wt 64 kg (141 lb)   SpO2 97%   BMI 24.20 kg/m²     Physical Exam  Vitals " reviewed.   Constitutional:       Appearance: Normal appearance. She is normal weight.   HENT:      Head: Normocephalic and atraumatic.      Nose: Nose normal.      Mouth/Throat:      Mouth: Mucous membranes are moist.      Pharynx: Oropharynx is clear.   Eyes:      Extraocular Movements: Extraocular movements intact.      Conjunctiva/sclera: Conjunctivae normal.      Pupils: Pupils are equal, round, and reactive to light.   Cardiovascular:      Rate and Rhythm: Normal rate and regular rhythm.      Pulses: Normal pulses.      Heart sounds: Normal heart sounds.   Pulmonary:      Effort: Pulmonary effort is normal.      Breath sounds: Normal breath sounds.   Abdominal:      General: Bowel sounds are normal.      Palpations: Abdomen is soft.   Musculoskeletal:      Cervical back: Normal range of motion and neck supple.      Comments: Right thumb pain with ROM   Skin:     General: Skin is warm and dry.   Neurological:      General: No focal deficit present.      Mental Status: She is alert and oriented to person, place, and time. Mental status is at baseline.   Psychiatric:         Mood and Affect: Mood normal.         Behavior: Behavior normal.         Thought Content: Thought content normal.         Judgment: Judgment normal.        PAT AIRWAY:   Airway:     Mallampati::  III    TM distance::  >3 FB    Neck ROM::  Full  normal      ASA:2  DASI: 50.7  METS: 9  CHADS: 4%  RCRI: 0.4%  STOP BANG: 3    Assessment and Plan:     Osteoarthritis of right wrist, unspecified osteoarthritis type: Arthroplasty Digit Hand right, Hand Tendon Transfer right.  DM2: HgbA1C collected in PAT. Pt is taking Metformin XR.   HTN: Pt is managed with losartan.   Hyperlipidemia: managed with atorvastatin.   Osteoporosis: Pt is taking alendronate.   Anxiety: Pt is taking citalopram.     CBC completed on 8/23/2024  BMP and HgbA1C collected in PAT.    EKG completed in PAT.     Fátima Turk, APRN-CNP

## 2024-09-27 NOTE — PREPROCEDURE INSTRUCTIONS
Why must I stop eating and drinking near surgery time?  With sedation, food or liquid in your stomach can enter your lungs causing serious complications  Increases nausea and vomiting    When do I need to stop eating and drinking before my surgery?   Do not eat or drink after midnight the night before your surgery/procedure.  You may have small sips of water to take your medication.    PAT DISCHARGE INSTRUCTIONS    Please call the Same Day Surgery (SDS) Department of the hospital where your procedure will be performed after 2:00 PM the day before your surgery. If you are scheduled on a Monday, or a Tuesday following a Monday holiday, you will need to call on the last business day prior to your surgery.      [facility]  [address]  [place] [id number]  [phone]    Please let your surgeon know if:      You develop any open sores, shingles, burning or painful urination as these may increase your risk of an infection.   You no longer wish to have the surgery.   Any other personal circumstances change that may lead to the need to cancel or defer this surgery-such as being sick or getting admitted to any hospital within one week of your planned procedure.    Your contact details change, such as a change of address or phone number.    Starting now:     Please DO NOT drink alcohol or smoke for 24 hours before surgery. It is well known that quitting smoking can make a huge difference to your health and recovery from surgery. The longer you abstain from smoking, the better your chances of a healthy recovery. If you need help with quitting, call 5-251-QUIT-NOW to be connected to a trained counselor who will discuss the best methods to help you quit.     Before your surgery:    Please stop all supplements 7 days prior to surgery. Or as directed by your surgeon.   Please stop taking NSAID pain medicine such as Advil and Motrin 7 days before surgery.    If you develop any  fever, cough, cold, rashes, cuts, scratches, scrapes, urinary symptoms or infection anywhere on your body (including teeth and gums) prior to surgery, please call your surgeon’s office as soon as possible. This may require treatment to reduce the chance of cancellation on the day of surgery.    The day before your surgery:   DIET- Please follow the diet instructions at the top of your packet.   Get a good night’s rest.  Use the special soap for bathing if you have been instructed to use one.    Scheduled surgery times may change and you will be notified if this occurs - please check your personal voicemail for any updates.     On the morning of surgery:   Wear comfortable, loose fitting clothes which open in the front. Please do not wear moisturizers, creams, lotions, makeup or perfume.    Please bring with you to surgery:   Photo ID and insurance card   Current list of medicines and allergies   Pacemaker/ Defibrillator/Heart stent cards   CPAP machine and mask    Slings/ splints/ crutches   A copy of your complete advanced directive/DHPOA.    Please do NOT bring with you to surgery:   All jewelry and valuables should be left at home.   Prosthetic devices such as contact lenses, hearing aids, dentures, eyelash extensions, hairpins and body piercings must be removed prior to going in to the surgical suite.    After outpatient surgery:   A responsible adult MUST accompany you at the time of discharge and stay with you for 24 hours after your surgery. You may NOT drive yourself home after surgery.    Do not drive, operate machinery, make critical decisions or do activities that require co-ordination or balance until after a night’s sleep.   Do not drink alcoholic beverages for 24 hours.   Instructions for resuming your medications will be provided by your surgeon.    CALL YOUR DOCTOR AFTER SURGERY IF YOU HAVE:     Chills and/or a fever of 101° F or higher.    Redness, swelling, pus or drainage from your surgical wound  or a bad smell from the wound.    Lightheadedness, fainting or confusion.    Persistent vomiting (throwing up) and are not able to eat or drink for 12 hours.    Three or more loose, watery bowel movements in 24 hours (diarrhea).   Difficulty or pain while urinating( after non-urological surgery)    Pain and swelling in your legs, especially if it is only on one side.    Difficulty breathing or are breathing faster than normal.    Any new concerning symptoms.      Patient Information: Pre-Operative Infection Prevention Measures     Why did I have my nose, under my arms, and groin swabbed?  The purpose of the swab is to identify Staphylococcus aureus inside your nose or on your skin.  The swab was sent to the laboratory for culture.  A positive swab/culture for Staphylococcus aureus is called colonization or carriage.      What is Staphylococcus aureus?  Staphylococcus aureus, also known as “staph”, is a germ found on the skin or in the nose of healthy people.  Sometimes Staphylococcus aureus can get into the body and cause an infection.  This can be minor (such as pimples, boils, or other skin problems).  It might also be serious (such as a blood infection, pneumonia, or a surgical site infection).    What is Staphylococcus aureus colonization or carriage?  Colonization or carriage means that a person has the germ but is not sick from it.  These bacteria can be spread on the hands or when breathing or sneezing.    How is Staphylococcus aureus spread?  It is most often spread by close contact with a person or item that carries it.    What happens if my culture is positive for Staphylococcus aureus?  Your doctor/medical team will use this information to guide any antibiotic treatment which may be necessary.  Regardless of the culture results, we will clean the inside of your nose with a betadine swab just before you have your surgery.      Will I get an infection if I have Staphylococcus aureus in my nose or on my  skin?  Anyone can get an infection with Staphylococcus aureus.  However, the best way to reduce your risk of infection is to follow the instructions provided to you for the use of your CHG soap and dental rinse.        Patient Information: Oral/Dental Rinse    What is oral/dental rinse?   It is a mouthwash. It is a way of cleaning the mouth with a germ-killing solution before your surgery.  The solution contains chlorhexidine, commonly known as CHG.   It is used inside the mouth to kill a bacteria known as Staphylococcus aureus.  Let your doctor know if you are allergic to Chlorhexidine.    Why do I need to use CHG oral/dental rinse?  The CHG oral/dental rinse helps to kill a bacteria in your mouth known as Staphylococcus aureus.     This reduces the risk of infection at the surgical site.      Using your CHG oral/dental rinse  STEPS:  Use your CHG oral/dental rinse after you brush your teeth the night before (at bedtime) and the morning of your surgery.  Follow all directions on your prescription label.    Use the cap on the container to measure 15ml   Swish (gargle if you can) the mouthwash in your mouth for at least 30 seconds, (do not swallow) and spit out  After you use your CHG rinse, do not rinse your mouth with water, drink or eat.  Please refer to the prescription label for the appropriate time to resume oral intake      What side effects might I have using the CHG oral/dental rinse?  CHG rinse will stick to plaque on the teeth.  Brush and floss just before use.  Teeth brushing will help avoid staining of plaque during use.      Patient Information: Home Preoperative Antibacterial Shower      What is a home preoperative antibacterial shower?  This shower is a way of cleaning the skin with a germ-killing solution before surgery.  The solution contains chlorhexidine, commonly known as CHG.  CHG is a skin cleanser with germ-killing ability.  Let your doctor know if you are allergic to chlorhexidine.    Why do  I need to take a preoperative antibacterial shower?  Skin is not sterile.  It is best to try to make your skin as free of germs as possible before surgery.  Proper cleansing with a germ-killing soap before surgery can lower the number of germs on your skin.  This helps to reduce the risk of infection at the surgical site.  Following the instructions listed below will help you prepare your skin for surgery.      How do I use the solution?  Steps:  Begin using your CHG soap 5 days before your scheduled surgery on ________________________.    First, wash and rinse your hair using the CHG soap. Keep CHG soap away from ear canals and eyes.  Rinse completely, do not condition.  Hair extensions should be removed.  Wash your face with your normal soap and rinse.    Apply the CHG solution to a clean wet washcloth.  Turn the water off or move away from the water spray to avoid premature rinsing of the CHG soap as you are applying.   Firmly lather your entire body from the neck down.  Do not use on your face.  Pay special attention to the area(s) where your incision(s) will be located unless they are on your face.  Avoid scrubbing your skin too hard.  The important point is to have the CHG soap sit on your skin for 3 minutes.    When the 3 minutes are up, turn on the water and rinse the CHG solution off your body completely.   DO NOT wash with regular soap after you have used the CHG soap solution  Pat yourself dry with a clean, freshly-laundered towel.  DO NOT apply powders, deodorants, or lotions.  Dress in clean, freshly laundered nightclothes.    Be sure to sleep with clean, freshly laundered sheets.  Be aware that CHG will cause stains on fabrics; if you wash them with bleach after use.  Rinse your washcloth and other linens that have contact with CHG completely.  Use only non-chlorine detergents to launder the items used.   The morning of surgery is the fifth day.  Repeat the above steps and dress in clean comfortable  clothing     Whom should I contact if I have any questions regarding the use of CHG soap?  Call the [facility] at [phone] if you have any questions.                    Medication List            Accurate as of September 27, 2024  9:11 AM. Always use your most recent med list.                alendronate 70 mg tablet  Commonly known as: Fosamax  Medication Adjustments for Surgery: Take/Use as prescribed     atorvastatin 20 mg tablet  Commonly known as: Lipitor  Take 1 tablet (20 mg) by mouth once daily as needed (cholesterol). for cholesterol  Medication Adjustments for Surgery: Take on the morning of surgery     Autolet lancing device  Commonly known as: Accu-Chek FastClix Lancing Dev  1 each once daily. Use as instructed     blood-glucose meter misc  Commonly known as: Accu-Chek Guide Glucose Meter  1 each by in vitro route once daily.     calcium acetate 667 mg capsule  Commonly known as: Phoslo  Additional Medication Adjustments for Surgery: Take last dose 7 days before surgery     citalopram 20 mg tablet  Commonly known as: CeleXA  TAKE ONE TABLET BY MOUTH DAILY  Notes to patient: Take evening dose before surgery.     losartan 25 mg tablet  Commonly known as: Cozaar  Take 1 tablet (25 mg) by mouth once daily.  Medication Adjustments for Surgery: Take last dose 1 day (24 hours) before surgery     metFORMIN  mg 24 hr tablet  Commonly known as: Glucophage-XR  Take 2 tablets (1,000 mg) by mouth 2 times daily (morning and late afternoon). Do not crush, chew, or split.  Medication Adjustments for Surgery: Do Not take on the morning of surgery

## 2024-09-29 LAB
ATRIAL RATE: 86 BPM
P AXIS: 46 DEGREES
P OFFSET: 207 MS
P ONSET: 149 MS
PR INTERVAL: 134 MS
Q ONSET: 216 MS
QRS COUNT: 14 BEATS
QRS DURATION: 80 MS
QT INTERVAL: 366 MS
QTC CALCULATION(BAZETT): 437 MS
QTC FREDERICIA: 412 MS
R AXIS: -18 DEGREES
STAPHYLOCOCCUS SPEC CULT: NORMAL
T AXIS: 16 DEGREES
T OFFSET: 399 MS
VENTRICULAR RATE: 86 BPM

## 2024-10-01 DIAGNOSIS — F41.1 GAD (GENERALIZED ANXIETY DISORDER): ICD-10-CM

## 2024-10-01 RX ORDER — CITALOPRAM 20 MG/1
20 TABLET, FILM COATED ORAL DAILY
Qty: 90 TABLET | Refills: 1 | Status: SHIPPED | OUTPATIENT
Start: 2024-10-01

## 2024-10-04 ENCOUNTER — ANESTHESIA (OUTPATIENT)
Dept: OPERATING ROOM | Facility: HOSPITAL | Age: 59
End: 2024-10-04
Payer: COMMERCIAL

## 2024-10-04 ENCOUNTER — HOSPITAL ENCOUNTER (OUTPATIENT)
Facility: HOSPITAL | Age: 59
Setting detail: OUTPATIENT SURGERY
Discharge: HOME | End: 2024-10-04
Attending: ORTHOPAEDIC SURGERY | Admitting: ORTHOPAEDIC SURGERY
Payer: COMMERCIAL

## 2024-10-04 ENCOUNTER — ANESTHESIA EVENT (OUTPATIENT)
Dept: OPERATING ROOM | Facility: HOSPITAL | Age: 59
End: 2024-10-04
Payer: COMMERCIAL

## 2024-10-04 VITALS
SYSTOLIC BLOOD PRESSURE: 112 MMHG | RESPIRATION RATE: 16 BRPM | WEIGHT: 140.65 LBS | HEIGHT: 64 IN | TEMPERATURE: 97.9 F | DIASTOLIC BLOOD PRESSURE: 64 MMHG | BODY MASS INDEX: 24.01 KG/M2 | OXYGEN SATURATION: 95 % | HEART RATE: 67 BPM

## 2024-10-04 DIAGNOSIS — M19.031 OSTEOARTHRITIS OF RIGHT WRIST, UNSPECIFIED OSTEOARTHRITIS TYPE: Primary | ICD-10-CM

## 2024-10-04 PROCEDURE — 2500000004 HC RX 250 GENERAL PHARMACY W/ HCPCS (ALT 636 FOR OP/ED): Performed by: NURSE ANESTHETIST, CERTIFIED REGISTERED

## 2024-10-04 PROCEDURE — 2500000005 HC RX 250 GENERAL PHARMACY W/O HCPCS: Performed by: NURSE ANESTHETIST, CERTIFIED REGISTERED

## 2024-10-04 PROCEDURE — 3700000001 HC GENERAL ANESTHESIA TIME - INITIAL BASE CHARGE: Performed by: ORTHOPAEDIC SURGERY

## 2024-10-04 PROCEDURE — 3600000009 HC OR TIME - EACH INCREMENTAL 1 MINUTE - PROCEDURE LEVEL FOUR: Performed by: ORTHOPAEDIC SURGERY

## 2024-10-04 PROCEDURE — 26480 TRANSPLANT HAND TENDON: CPT | Performed by: ORTHOPAEDIC SURGERY

## 2024-10-04 PROCEDURE — C1713 ANCHOR/SCREW BN/BN,TIS/BN: HCPCS | Performed by: ORTHOPAEDIC SURGERY

## 2024-10-04 PROCEDURE — 7100000002 HC RECOVERY ROOM TIME - EACH INCREMENTAL 1 MINUTE: Performed by: ORTHOPAEDIC SURGERY

## 2024-10-04 PROCEDURE — 2500000001 HC RX 250 WO HCPCS SELF ADMINISTERED DRUGS (ALT 637 FOR MEDICARE OP): Performed by: ORTHOPAEDIC SURGERY

## 2024-10-04 PROCEDURE — 2500000004 HC RX 250 GENERAL PHARMACY W/ HCPCS (ALT 636 FOR OP/ED): Performed by: ORTHOPAEDIC SURGERY

## 2024-10-04 PROCEDURE — 3700000002 HC GENERAL ANESTHESIA TIME - EACH INCREMENTAL 1 MINUTE: Performed by: ORTHOPAEDIC SURGERY

## 2024-10-04 PROCEDURE — 82947 ASSAY GLUCOSE BLOOD QUANT: CPT

## 2024-10-04 PROCEDURE — 25447 ARTHRP NTRCRP/CRP/MTCR NTRPS: CPT | Performed by: ORTHOPAEDIC SURGERY

## 2024-10-04 PROCEDURE — 2780000003 HC OR 278 NO HCPCS: Performed by: ORTHOPAEDIC SURGERY

## 2024-10-04 PROCEDURE — 7100000009 HC PHASE TWO TIME - INITIAL BASE CHARGE: Performed by: ORTHOPAEDIC SURGERY

## 2024-10-04 PROCEDURE — 25447 ARTHRP NTRCRP/CRP/MTCR NTRPS: CPT | Performed by: PHYSICIAN ASSISTANT

## 2024-10-04 PROCEDURE — 26480 TRANSPLANT HAND TENDON: CPT | Performed by: PHYSICIAN ASSISTANT

## 2024-10-04 PROCEDURE — 7100000010 HC PHASE TWO TIME - EACH INCREMENTAL 1 MINUTE: Performed by: ORTHOPAEDIC SURGERY

## 2024-10-04 PROCEDURE — 2500000004 HC RX 250 GENERAL PHARMACY W/ HCPCS (ALT 636 FOR OP/ED): Mod: JZ | Performed by: PHYSICIAN ASSISTANT

## 2024-10-04 PROCEDURE — 7100000001 HC RECOVERY ROOM TIME - INITIAL BASE CHARGE: Performed by: ORTHOPAEDIC SURGERY

## 2024-10-04 PROCEDURE — 3600000004 HC OR TIME - INITIAL BASE CHARGE - PROCEDURE LEVEL FOUR: Performed by: ORTHOPAEDIC SURGERY

## 2024-10-04 DEVICE — FIBERLOCK SUSPENSION SYSTEM
Type: IMPLANTABLE DEVICE | Site: HAND | Status: FUNCTIONAL
Brand: ARTHREX®

## 2024-10-04 RX ORDER — CEFAZOLIN SODIUM 2 G/100ML
2 INJECTION, SOLUTION INTRAVENOUS ONCE
Status: COMPLETED | OUTPATIENT
Start: 2024-10-04 | End: 2024-10-04

## 2024-10-04 RX ORDER — OXYCODONE AND ACETAMINOPHEN 5; 325 MG/1; MG/1
1 TABLET ORAL EVERY 6 HOURS PRN
Qty: 20 TABLET | Refills: 0 | Status: SHIPPED | OUTPATIENT
Start: 2024-10-04 | End: 2024-10-09

## 2024-10-04 RX ORDER — OXYCODONE HYDROCHLORIDE 5 MG/1
5 TABLET ORAL EVERY 4 HOURS PRN
Status: DISCONTINUED | OUTPATIENT
Start: 2024-10-04 | End: 2024-10-04 | Stop reason: HOSPADM

## 2024-10-04 RX ORDER — OXYCODONE HYDROCHLORIDE 5 MG/1
10 TABLET ORAL EVERY 4 HOURS PRN
Status: DISCONTINUED | OUTPATIENT
Start: 2024-10-04 | End: 2024-10-04 | Stop reason: HOSPADM

## 2024-10-04 RX ORDER — LIDOCAINE HYDROCHLORIDE 10 MG/ML
INJECTION, SOLUTION INFILTRATION; PERINEURAL AS NEEDED
Status: DISCONTINUED | OUTPATIENT
Start: 2024-10-04 | End: 2024-10-04 | Stop reason: HOSPADM

## 2024-10-04 RX ORDER — LIDOCAINE HYDROCHLORIDE 10 MG/ML
0.1 INJECTION, SOLUTION EPIDURAL; INFILTRATION; INTRACAUDAL; PERINEURAL ONCE
Status: DISCONTINUED | OUTPATIENT
Start: 2024-10-04 | End: 2024-10-04 | Stop reason: HOSPADM

## 2024-10-04 RX ORDER — BACITRACIN ZINC 500 UNIT/G
OINTMENT IN PACKET (EA) TOPICAL AS NEEDED
Status: DISCONTINUED | OUTPATIENT
Start: 2024-10-04 | End: 2024-10-04 | Stop reason: HOSPADM

## 2024-10-04 RX ORDER — DOXYCYCLINE 100 MG/1
100 CAPSULE ORAL 2 TIMES DAILY
Qty: 14 CAPSULE | Refills: 0 | Status: SHIPPED | OUTPATIENT
Start: 2024-10-04 | End: 2024-10-11

## 2024-10-04 RX ORDER — SODIUM CHLORIDE, SODIUM LACTATE, POTASSIUM CHLORIDE, CALCIUM CHLORIDE 600; 310; 30; 20 MG/100ML; MG/100ML; MG/100ML; MG/100ML
100 INJECTION, SOLUTION INTRAVENOUS CONTINUOUS
Status: DISCONTINUED | OUTPATIENT
Start: 2024-10-04 | End: 2024-10-04 | Stop reason: HOSPADM

## 2024-10-04 RX ORDER — NORETHINDRONE AND ETHINYL ESTRADIOL 0.5-0.035
KIT ORAL AS NEEDED
Status: DISCONTINUED | OUTPATIENT
Start: 2024-10-04 | End: 2024-10-04

## 2024-10-04 RX ORDER — SODIUM CHLORIDE, SODIUM LACTATE, POTASSIUM CHLORIDE, CALCIUM CHLORIDE 600; 310; 30; 20 MG/100ML; MG/100ML; MG/100ML; MG/100ML
20 INJECTION, SOLUTION INTRAVENOUS CONTINUOUS
Status: DISCONTINUED | OUTPATIENT
Start: 2024-10-04 | End: 2024-10-04 | Stop reason: HOSPADM

## 2024-10-04 RX ORDER — PROPOFOL 10 MG/ML
INJECTION, EMULSION INTRAVENOUS AS NEEDED
Status: DISCONTINUED | OUTPATIENT
Start: 2024-10-04 | End: 2024-10-04

## 2024-10-04 RX ORDER — FENTANYL CITRATE 50 UG/ML
INJECTION, SOLUTION INTRAMUSCULAR; INTRAVENOUS AS NEEDED
Status: DISCONTINUED | OUTPATIENT
Start: 2024-10-04 | End: 2024-10-04

## 2024-10-04 RX ORDER — BUPIVACAINE HYDROCHLORIDE 5 MG/ML
INJECTION, SOLUTION PERINEURAL AS NEEDED
Status: DISCONTINUED | OUTPATIENT
Start: 2024-10-04 | End: 2024-10-04 | Stop reason: HOSPADM

## 2024-10-04 RX ORDER — LIDOCAINE HYDROCHLORIDE 10 MG/ML
INJECTION, SOLUTION EPIDURAL; INFILTRATION; INTRACAUDAL; PERINEURAL AS NEEDED
Status: DISCONTINUED | OUTPATIENT
Start: 2024-10-04 | End: 2024-10-04

## 2024-10-04 RX ORDER — ONDANSETRON HYDROCHLORIDE 2 MG/ML
INJECTION, SOLUTION INTRAVENOUS AS NEEDED
Status: DISCONTINUED | OUTPATIENT
Start: 2024-10-04 | End: 2024-10-04

## 2024-10-04 RX ORDER — KETOROLAC TROMETHAMINE 30 MG/ML
INJECTION, SOLUTION INTRAMUSCULAR; INTRAVENOUS AS NEEDED
Status: DISCONTINUED | OUTPATIENT
Start: 2024-10-04 | End: 2024-10-04

## 2024-10-04 RX ORDER — ACETAMINOPHEN 325 MG/1
650 TABLET ORAL EVERY 4 HOURS PRN
Status: DISCONTINUED | OUTPATIENT
Start: 2024-10-04 | End: 2024-10-04 | Stop reason: HOSPADM

## 2024-10-04 RX ORDER — MIDAZOLAM HYDROCHLORIDE 1 MG/ML
INJECTION, SOLUTION INTRAMUSCULAR; INTRAVENOUS AS NEEDED
Status: DISCONTINUED | OUTPATIENT
Start: 2024-10-04 | End: 2024-10-04

## 2024-10-04 ASSESSMENT — PAIN SCALES - GENERAL
PAINLEVEL_OUTOF10: 0 - NO PAIN
PAINLEVEL_OUTOF10: 0 - NO PAIN
PAINLEVEL_OUTOF10: 10 - WORST POSSIBLE PAIN
PAINLEVEL_OUTOF10: 0 - NO PAIN

## 2024-10-04 ASSESSMENT — PAIN - FUNCTIONAL ASSESSMENT
PAIN_FUNCTIONAL_ASSESSMENT: 0-10

## 2024-10-04 ASSESSMENT — PAIN DESCRIPTION - DESCRIPTORS: DESCRIPTORS: ACHING

## 2024-10-04 NOTE — ANESTHESIA POSTPROCEDURE EVALUATION
"Patient: Mariia Rodarte \"Calli\"    Procedure Summary       Date: 10/04/24 Room / Location: [facility] / [facility]    Anesthesia Start: 1030 Anesthesia Stop: 1142    Procedures:       Arthroplasty Digit Hand (Right: Hand)      Hand Tendon Transfer ( arthrex ) (Right: Hand) Diagnosis:       Osteoarthritis of right wrist, unspecified osteoarthritis type      (Osteoarthritis of right wrist, unspecified osteoarthritis type [M19.031])    Surgeons: Justen Henry MD Responsible Provider: Enzo Aguirre DO    Anesthesia Type: MAC ASA Status: 2            Anesthesia Type: MAC    Vitals Value Taken Time   /67 10/04/24 1159   Temp 36.6 °C (97.9 °F) 10/04/24 1159   Pulse 74 10/04/24 1159   Resp 18 10/04/24 1159   SpO2 95 % 10/04/24 1159       Anesthesia Post Evaluation    Patient location during evaluation: PACU  Patient participation: complete - patient participated  Level of consciousness: awake and alert  Pain management: adequate  Airway patency: patent  Cardiovascular status: acceptable  Respiratory status: acceptable  Hydration status: acceptable  Postoperative Nausea and Vomiting: none        No notable events documented.    "

## 2024-10-04 NOTE — OP NOTE
"Arthroplasty Digit Hand (R), Hand Tendon Transfer ( arthrex ) (R) Operative Note     Date: 10/4/2024  OR Location: [facility]    Name: Mariia Rodarte \"Calli\", : 1965, Age: 59 y.o., MRN: [id number], Sex: female    Diagnosis  Pre-op Diagnosis      * Osteoarthritis of right wrist, unspecified osteoarthritis type [M19.031] Post-op Diagnosis     * Osteoarthritis of right wrist, unspecified osteoarthritis type [M19.031]     Procedures  Arthroplasty Digit Hand  24070 - DC ARTHRP INTERPOS INTERCARPAL/METACARPAL JOINTS    Hand Tendon Transfer ( arthrex )  83574 - DC TR/TRNSPL TDN CARP/MTCRPL HAND W/O FR GRF EA TDN  #1 right thumb and wrist carpometacarpal arthroplasty with suspensioplasty and interposition  #2 right hand FCR tendon transfer to the thumb metacarpal base    Surgeons      * Justen Henry - Primary    Resident/Fellow/Other Assistant:  Surgeons and Role:  * No surgeons found with a matching role *  Celia James PA-C  Procedure Summary  Anesthesia: Anesthesia type not filed in the log.  ASA: ASA status not filed in the log.  Anesthesia Staff: Anesthesiologist: Enzo Aguirre DO  CRNA: VICENTE Chris  Estimated Blood Loss: 5mL  Intra-op Medications:   Administrations occurring from 1015 to 1135 on 10/04/24:   Medication Name Total Dose   BUPivacaine HCl (Marcaine) 0.5 % (5 mg/mL) injection 5 mL   lidocaine (Xylocaine) 10 mg/mL (1 %) injection 5 mL   lactated Ringer's infusion 135.42 mL   ceFAZolin (Ancef) 2 g in dextrose (iso)  mL 2 g              Anesthesia Record               Intraprocedure I/O Totals       None           Specimen: No specimens collected     Staff:   Circulator: Mamta Taylor Person: Celia Taylor Person: Sofía Godoy Circulator: Bethany         Drains and/or Catheters: * None in log *    Tourniquet Times:     Total Tourniquet Time Documented:  Arm - Lower (Right) - 28 minutes  Total: Arm - Lower (Right) - 28 minutes      Implants:  Implants       Type Name Action Serial " No.       FIBERLOCK SUSPENSION IMPLANT KIT Implanted               Findings: Severe arthritis    Indications: Calli Rodarte is an 59 y.o. female who is having surgery for Osteoarthritis of right wrist, unspecified osteoarthritis type [M19.031].  Carla patient comes in for the above procedure understanding clear there are severe risk such as nerve artery tendon damage infection continued pain collapse and possible need of future surgery.  Wish to proceed under stands the variable long-term pinch strength and pain relief results informed consent obtained    The patient was seen in the preoperative area. The risks, benefits, complications, treatment options, non-operative alternatives, expected recovery and outcomes were discussed with the patient. The possibilities of reaction to medication, pulmonary aspiration, injury to surrounding structures, bleeding, recurrent infection, the need for additional procedures, failure to diagnose a condition, and creating a complication requiring transfusion or operation were discussed with the patient. The patient concurred with the proposed plan, giving informed consent.  The site of surgery was properly noted/marked if necessary per policy. The patient has been actively warmed in preoperative area. Preoperative antibiotics have been ordered and given within 1 hours of incision. Venous thrombosis prophylaxis have been ordered including bilateral sequential compression devices    Procedure Details: Carla patient brought the operating room and after sterilely prepping draping and forming a timeout we placed abundant local and made a 4 cm incision going down taking out the trapezium piecemeal leaving the volar capsule intact of his severely arthritic joint no significant STT arthritis at this point we harvested the FCR tendon from 2 forearm incisions and split this in the radial ulnar halves to the base of the index finger we did a 3.5 drill bit and placed 1 limb through the  thumb metacarpal as our tendon transfer    At this point we then tied the 2 halves with securing this with 2-0 FiberWire prior to this we placed an Arthrex all suture anchor in the base of the index metacarpal as accessory suspension this was tied to keep the space open after tying and suturing the knot with 2-0 FiberWire the rest was sewn to the volar capsule as an interposition arthroplasty excellent dorsal capsular flaps were sewn over the top of the pants over vest also gave suspension and interposition at this point we had good alignment MP joint was stable we let down the tourniquet coagulated all bleeders radial artery was patent fingers and thumb pinked up no complications did copious irrigation layered closure was performed patient was placed in a thumb spica splint and understands postoperative care no complications.  Celia James PA-C acted as a surgical assistant during this case and her assistance greatly reduced operative time and aided in performance of the case  Complications:  None; patient tolerated the procedure well.    Disposition: PACU - hemodynamically stable.  Condition: stable         Additional Details: 0    Attending Attestation: I performed the procedure.    Justen Henry  Phone Number: [phone]

## 2024-10-04 NOTE — DISCHARGE INSTRUCTIONS
You had a CMC arthroplasty today.  Local anesthesia was used during the procedure and you may have some numbness in the region for a few hours postoperatively.    Please leave the splint intact after surgery, if it gets too tight you may loosen the Ace wrap and cotton underneath but do not remove fully.    It is important to elevate the hand for the next 3 to 5 days, and move your fingers fully.  It is very normal to have some bruising in the region and it may travel down the forearm.    If able, you can take 800 mg of ibuprofen along with the prescription pain medication, you can alternate these every 4 hours and slowly wean off of the prescription pain medication.    Please call the office for a postop appointment in 10 to 14 days after surgery.

## 2024-10-04 NOTE — ANESTHESIA PROCEDURE NOTES
Airway  Date/Time: 10/4/2024 10:38 AM  Urgency: elective    Airway not difficult    Staffing  Performed: CRNA   Authorized by: Enzo Aguirre DO    Performed by: VICENTE Chris  Patient location during procedure: OR    Indications and Patient Condition  Indications for airway management: anesthesia  Spontaneous Ventilation: absent  Sedation level: deep  Preoxygenated: yes  Patient position: sniffing  Mask difficulty assessment: 1 - vent by mask    Final Airway Details  Final airway type: supraglottic airway      Successful airway: Size 3     Number of attempts at approach: 1

## 2024-10-04 NOTE — ANESTHESIA PREPROCEDURE EVALUATION
"Patient: Mariia Rodarte \"Calli\"    Procedure Information       Anesthesia Start Date/Time: 10/04/24 1030    Procedures:       Arthroplasty Digit Hand (Right: Hand)      Hand Tendon Transfer ( arthrex ) (Right: Hand)    Location: [facility] / [facility]    Surgeons: Justen Henry MD            Relevant Problems   Cardiac   (+) Essential hypertension   (+) Hyperlipidemia      Neuro   (+) TITI (generalized anxiety disorder)      Endocrine   (+) Type 2 diabetes mellitus without complication, without long-term current use of insulin (Multi)      Musculoskeletal   (+) Osteoarthritis of right wrist   (+) Primary osteoarthritis of right wrist      GYN   (+) History of hysterectomy       Clinical information reviewed:   Tobacco  Allergies  Meds   Med Hx  Surg Hx  OB Status  Fam Hx  Soc   Hx        NPO Detail:  NPO/Void Status  Date of Last Liquid: 10/03/24  Time of Last Liquid: 2000  Date of Last Solid: 10/03/24  Time of Last Solid: 1900  Time of Last Void: 0910         Physical Exam    Airway  Mallampati: II  TM distance: >3 FB  Neck ROM: full     Cardiovascular - normal exam     Dental    Pulmonary - normal exam     Abdominal            Anesthesia Plan    History of general anesthesia?: yes  History of complications of general anesthesia?: no    ASA 2     general     intravenous induction   Anesthetic plan and risks discussed with patient.  Use of blood products discussed with patient who.    Plan discussed with CRNA and attending.      "

## 2024-10-07 LAB — GLUCOSE BLD MANUAL STRIP-MCNC: 113 MG/DL (ref 74–99)

## 2024-10-10 DIAGNOSIS — I10 PRIMARY HYPERTENSION: ICD-10-CM

## 2024-10-10 RX ORDER — LOSARTAN POTASSIUM 25 MG/1
25 TABLET ORAL DAILY
Qty: 90 TABLET | Refills: 1 | Status: SHIPPED | OUTPATIENT
Start: 2024-10-10

## 2024-10-15 DIAGNOSIS — E78.2 MIXED HYPERLIPIDEMIA: ICD-10-CM

## 2024-10-15 RX ORDER — ATORVASTATIN CALCIUM 20 MG/1
20 TABLET, FILM COATED ORAL DAILY
Qty: 90 TABLET | Refills: 3 | Status: SHIPPED | OUTPATIENT
Start: 2024-10-15

## 2024-10-17 ENCOUNTER — OFFICE VISIT (OUTPATIENT)
Dept: ORTHOPEDIC SURGERY | Facility: HOSPITAL | Age: 59
End: 2024-10-17
Payer: COMMERCIAL

## 2024-10-17 ENCOUNTER — TREATMENT (OUTPATIENT)
Dept: OCCUPATIONAL THERAPY | Facility: HOSPITAL | Age: 59
End: 2024-10-17
Payer: COMMERCIAL

## 2024-10-17 DIAGNOSIS — M19.031 PRIMARY OSTEOARTHRITIS OF RIGHT WRIST: Primary | ICD-10-CM

## 2024-10-17 DIAGNOSIS — M19.031 OSTEOARTHRITIS OF RIGHT WRIST, UNSPECIFIED OSTEOARTHRITIS TYPE: Primary | ICD-10-CM

## 2024-10-17 PROCEDURE — 3044F HG A1C LEVEL LT 7.0%: CPT | Performed by: ORTHOPAEDIC SURGERY

## 2024-10-17 PROCEDURE — 99211 OFF/OP EST MAY X REQ PHY/QHP: CPT | Performed by: ORTHOPAEDIC SURGERY

## 2024-10-17 PROCEDURE — L3808 WHFO, RIGID W/O JOINTS: HCPCS | Performed by: OCCUPATIONAL THERAPIST

## 2024-10-17 PROCEDURE — 3062F POS MACROALBUMINURIA REV: CPT | Performed by: ORTHOPAEDIC SURGERY

## 2024-10-17 PROCEDURE — 3048F LDL-C <100 MG/DL: CPT | Performed by: ORTHOPAEDIC SURGERY

## 2024-10-17 PROCEDURE — 4010F ACE/ARB THERAPY RXD/TAKEN: CPT | Performed by: ORTHOPAEDIC SURGERY

## 2024-10-17 ASSESSMENT — PAIN - FUNCTIONAL ASSESSMENT: PAIN_FUNCTIONAL_ASSESSMENT: 0-10

## 2024-10-17 ASSESSMENT — PAIN SCALES - GENERAL: PAINLEVEL_OUTOF10: 0 - NO PAIN

## 2024-10-17 NOTE — PROGRESS NOTES
"Occupational Therapy  Occupational Therapy Orthosis Evaluation    Patient Name: Mariia Rodarte \"Phan"  MRN: [id number]  Today's Date: 10/17/2024       Insurance:  Visit number: 1  Time:  Time Calculation  Start Time: 1115  Stop Time: 1135  Time Calculation (min): 20 min       Insurance Type: Payor: ANTHEM / Plan: ANTHEM HMP / Product Type: *No Product type* /     General:  Reason for visit: R CMC  Referred by: Celia James PA-C    Current Problem  1. Primary osteoarthritis of right wrist            Precautions: full time wear of orthosis for 4 weeks       Medical History Form: Reviewed (scanned into chart)    Subjective:   Chief Complaint: R thumb  MAKAYLA: Chronic     DOS: 10/04/2024: #1 right thumb and wrist carpometacarpal arthroplasty with suspensioplasty and interposition  #2 right hand FCR tendon transfer to the thumb metacarpal base        PAIN     Patient reports mild pain in thumb.    Relevant Information (PMH & Previous Tests/Imaging): reviewed in chart       Patients Living Environment: Reviewed and no concern    Primary Language: English         Red Flags: Do you have any of the following? No  Fever/chills, unexplained weight changes, dizziness/fainting, unexplained change in bowel or bladder functions, unexplained malaise or muscle weakness, night pain/sweats, numbness or tingling    Physical Observation: scabbing along incision sites      EDUCATION: home exercise program, plan of care, activity modifications, pain management, and injury pathology       Goals:      Plan of care was developed with input and agreement by the patient    Treatments:         Orthosis:      20 min   Therapist fabricated forearm based thumb spica with wrist neutral, MCP slightly flexed and IP free.        Assessment: Patient is a 60 yo female  s/p R thumb arthroplasty resulting in limited participation in pain-free ADLs and inability to perform at their prior level of function. Pt would benefit from occupational therapy to " address the impairments found & listed previously in the objective section in order to return to safe and pain-free ADLs and prior level of function.       Plan:    Patient to follow up at a  location closer to home. Patient to wear orthosis full time for next four weeks.       Steven Dukes, OT

## 2024-10-17 NOTE — PROGRESS NOTES
Reason for Appointment  Chief Complaint   Patient presents with    Right Hand - Follow-up     History of Present Illness  Patient is here today 2 weeks s/p a right thumb CMC arthroplasty on 10/4/24. Patient is doing well overall.  Wounds are healing nicely with no signs of infection, sutures removed today.  We will get her into a custom long opponent splint she will wear full-time for the next 4 weeks other than hygiene.  Digits are moving well otherwise.  She understands no heavy pinching or gripping with this hand.  We will follow-up with her in 4 weeks.    Assessment   Encounter Diagnosis   Name Primary?    Osteoarthritis of right wrist, unspecified osteoarthritis type Yes

## 2024-10-18 ENCOUNTER — TELEPHONE (OUTPATIENT)
Dept: ORTHOPEDIC SURGERY | Facility: CLINIC | Age: 59
End: 2024-10-18
Payer: COMMERCIAL

## 2024-10-18 NOTE — TELEPHONE ENCOUNTER
Patient has a brace put on yesterday her thumb area is not fitting right.  She said its a little to tight.

## 2024-10-18 NOTE — TELEPHONE ENCOUNTER
Therapy needs to adjust it for her. She can try and call [facility] to see if they will adjust it, otherwise will have to come back to [place]. Please give her numbers to call

## 2024-11-11 ENCOUNTER — APPOINTMENT (OUTPATIENT)
Dept: ORTHOPEDIC SURGERY | Facility: CLINIC | Age: 59
End: 2024-11-11
Payer: COMMERCIAL

## 2024-11-11 ENCOUNTER — CLINICAL SUPPORT (OUTPATIENT)
Dept: OCCUPATIONAL THERAPY | Facility: CLINIC | Age: 59
End: 2024-11-11
Payer: COMMERCIAL

## 2024-11-11 DIAGNOSIS — M19.031 OSTEOARTHRITIS OF RIGHT WRIST, UNSPECIFIED OSTEOARTHRITIS TYPE: ICD-10-CM

## 2024-11-11 DIAGNOSIS — M19.031 OSTEOARTHRITIS OF RIGHT WRIST, UNSPECIFIED OSTEOARTHRITIS TYPE: Primary | ICD-10-CM

## 2024-11-11 PROCEDURE — 3062F POS MACROALBUMINURIA REV: CPT | Performed by: ORTHOPAEDIC SURGERY

## 2024-11-11 PROCEDURE — 99024 POSTOP FOLLOW-UP VISIT: CPT | Performed by: ORTHOPAEDIC SURGERY

## 2024-11-11 PROCEDURE — 3048F LDL-C <100 MG/DL: CPT | Performed by: ORTHOPAEDIC SURGERY

## 2024-11-11 PROCEDURE — 97165 OT EVAL LOW COMPLEX 30 MIN: CPT | Mod: GO | Performed by: OCCUPATIONAL THERAPIST

## 2024-11-11 PROCEDURE — L3924 HFO WITHOUT JOINTS PRE OTS: HCPCS | Performed by: ORTHOPAEDIC SURGERY

## 2024-11-11 PROCEDURE — 97110 THERAPEUTIC EXERCISES: CPT | Mod: GO | Performed by: OCCUPATIONAL THERAPIST

## 2024-11-11 PROCEDURE — L3921 HFO W/JOINT(S) CF: HCPCS | Performed by: OCCUPATIONAL THERAPIST

## 2024-11-11 PROCEDURE — 4010F ACE/ARB THERAPY RXD/TAKEN: CPT | Performed by: ORTHOPAEDIC SURGERY

## 2024-11-11 PROCEDURE — 3044F HG A1C LEVEL LT 7.0%: CPT | Performed by: ORTHOPAEDIC SURGERY

## 2024-11-11 SDOH — ECONOMIC STABILITY: GENERAL: QUALITY OF LIFE: GOOD

## 2024-11-11 ASSESSMENT — ENCOUNTER SYMPTOMS
PAIN SCALE AT HIGHEST: 5
ALLEVIATING FACTORS: REST
PAIN SCALE: 5
ALLEVIATING FACTORS: MEDICATIONS
ALLEVIATING FACTORS: HEAT
ALLEVIATING FACTORS: ICE
QUALITY: THROBBING
PAIN SCALE AT LOWEST: 5

## 2024-11-11 ASSESSMENT — PAIN - FUNCTIONAL ASSESSMENT: PAIN_FUNCTIONAL_ASSESSMENT: 0-10

## 2024-11-11 ASSESSMENT — PAIN SCALES - GENERAL: PAINLEVEL_OUTOF10: 2

## 2024-11-11 NOTE — PROGRESS NOTES
"Evaluation/Treatment    Patient Name: Mariia Rodarte \"Phan"  MRN: [id number]  : 1965  Today's Date: 24    Time Calculation  Start Time: 930  Stop Time: 1030  Time Calculation (min): 60 min  OT Evaluation Time Entry  OT Evaluation (Low) Time Entry: 20  OT Therapeutic Procedures Time Entry  Therapeutic Exercise Time Entry: 25      Subjective   Current Problem/Diagnosis:  1. Osteoarthritis of right wrist, unspecified osteoarthritis type  Referral to Occupational Therapy    Follow Up In Occupational Therapy        Subjective Evaluation    History of Present Illness  Date of surgery: 2024  Mechanism of injury: Patient is a 59-year-old female with 10 year history of worsening R wrist/thumb pain; patient underwent R CMC arthroplasy with FCR tendon transfer on 10/4/24 with Dr. Henry.    Quality of life: good    Pain  Current pain ratin  At best pain ratin  At worst pain ratin  Location: R thumb/wirst  Quality: throbbing  Relieving factors: ice, rest, medications and heat    Hand dominance: left    Diagnostic Tests  X-ray: abnormal    Treatments  Treatments tried: Splint fabrication post-op.  Patient Goals  Patient goals for therapy: decreased edema, decreased pain, increased motion, increased strength, independence with ADLs/IADLs, return to sport/leisure activities and return to work  Patient goal: \"I'd like to get back to painting again.\"       Precautions: Strengthening=12 weeks (24)       Objective     Prior Functional Status: Fully Independent     Work History:     Occupation and Activities:  Work status: off work  Job title/type of work:  Interior/Exterior house painting/decorating    Current functional limitations: Grooming, Bathing, Dressing, Lifting, Holding, Crafts/hobbies, Gardening, Tool handling, and Driving       Objective     Sensation: +Occasional tingling along fifth metacarpal    Appearance: +3 surgical incisions; two at volar FA ~1 cm in length and at R thumb ~3 cm in " length    Edema: +Mild R hand/distal FA    Coordination: +Impiared      Range of Motion/Strength:     Upper Extremity ROM  R shoulder/elbow WFL at all planes.      AROM     Right Left   FOREARM Pronation 90 WFL    Supination 75 WFL   WRIST Extension 20 WFL    Flexion 40 WFL    Radial Deviation 5 WFL    Ulnar Deviation  15      WFL        Hand Range of Motion       AROM     Right Left   THUMB CMC Extension/Flexion 0/10 WFL    MP Extension/Flexion 0/20 WFL    IP Extension/Flexion 0/25 WFL       Hand Strength   (lbs) Right Left   1     2 NT NT   3     4     5       Pinch Right Left   2-pt NT NT   3-pt NT NT   Lateral NT NT       Outcome Measure: UEFI= 35/80; 56% impaired    Splinting: Therapist fabricated/fitted custom short opponens splint for patient's R hand; instructed patient in proper don/doff tech, proper fit, wear recommendations, importance of skin monitoring, and splint care.    Modalities: Therapist educated patient on use of hot/cold modalities for pain management and pre/post-HEP.    Therapy/Activity: Therapist provided demonstration with verbal instruction for scar massage for management of scar tissue and adhesions. Therapist instructed patient in AROM ther for FA sup/pro, wrist flexion/extension, and RD/UD x10 reps each; written handout issued; HEP established. Therapist provided demonstration with verbal instruction for R thumb radial abduction/adduction, opposition, composite flexion/extension, palmar abduction/adduction, IP flexion/extension, and CW/CCW circumduction x10 reps each; written handout issued.    EDUCATION: See above.       Assessment & Plan     Assessment  Impairments: abnormal coordination, abnormal or restricted ROM, activity intolerance, impaired physical strength, lacks appropriate home exercise program, pain with function and weight-bearing intolerance  Assessment details: Patient is a 59-year-old female who underwent R CMC arthroplasty with FCR transfer on 10/4/24 with  "subsequent splinting needs, impaired AROM, impaired sensation, impaired coordination, weakness, and impaired functional use of R, dominant hand. Skilled OT intervention indicated to address deficits and facilitate return to PLOF.    Low complexity evaluation selected due to patient's clinical presentation, medical stability, and condition uncomplicated by existing co-morbidities that may affect patient's rehab tolerance, progression, and potential.   Prognosis: good    Goals  \"I'd like to get back to painting again.\"    Plan  Planned modality interventions: cryotherapy, fluidotherapy, TENS and thermotherapy (hydrocollator packs)  Planned therapy interventions: ADL retraining, compression, dressing changes, fine motor coordination training, flexibility, functional ROM exercises, manual therapy, motor coordination training, neuromuscular re-education, orthotic fitting/training, soft tissue mobilization, strengthening, stretching and therapeutic activities  Frequency: 1x week  Duration in visits: 8  Treatment plan discussed with: patient  Plan details: Yfn HERNANDEZ; 70 visits/yr           Goals:  Active       OT Goals       1. Patient will increase R FA supination to at least 75 degrees for improved functional use of RUE.        Start:  11/12/24    Expected End:  12/09/24            2. Patient will increase R wrist flexion/extension to at least 60 degrees in order to increase functional use of RUE.       Start:  11/12/24    Expected End:  12/09/24            3. Patient will increase R thumb MP an IP flexion by at least 15 degrees to increase functional use of thumb with pinching during ADL's.       Start:  11/12/24    Expected End:  12/09/24            4. Patient will increase R hand  strength to within 90% of L for improved functional use of R hand.       Start:  11/12/24    Expected End:  01/14/25            5. Patient will increase R hand pinch strength in all patterns to within 90% of L for improved functional " use of R hand.       Start:  11/12/24    Expected End:  01/14/25            6. Patient will increase overall functional ease and independence AEB UEFI score of at least 65/80 for improved quality of life for patient.       Start:  11/12/24    Expected End:  01/14/25

## 2024-11-11 NOTE — PROGRESS NOTES
Reason for Appointment  Chief Complaint   Patient presents with    Right Hand - Follow-up     History of Present Illness  Patient is here today 6 weeks s/p a right thumb CMC arthroplasty. Patient is doing well overall.  Wounds are fully healed with no signs of infection.  She has been in a long opponent splint full-time other than hygiene for the last 4 weeks.  We will get her into a short opponens splint and we will begin wrist and thumb motion.  She still understands no heavy pinching with this thumb.  We will follow-up with her in 2 months.    Assessment   Encounter Diagnosis   Name Primary?    Osteoarthritis of right wrist, unspecified osteoarthritis type Yes

## 2024-11-18 ENCOUNTER — TREATMENT (OUTPATIENT)
Dept: OCCUPATIONAL THERAPY | Facility: CLINIC | Age: 59
End: 2024-11-18
Payer: COMMERCIAL

## 2024-11-18 DIAGNOSIS — M19.031 OSTEOARTHRITIS OF RIGHT WRIST, UNSPECIFIED OSTEOARTHRITIS TYPE: ICD-10-CM

## 2024-11-18 PROCEDURE — 97110 THERAPEUTIC EXERCISES: CPT | Mod: GO,CO

## 2024-11-18 ASSESSMENT — PAIN DESCRIPTION - DESCRIPTORS: DESCRIPTORS: SORE

## 2024-11-18 ASSESSMENT — PAIN SCALES - GENERAL: PAINLEVEL_OUTOF10: 2

## 2024-11-18 ASSESSMENT — PAIN - FUNCTIONAL ASSESSMENT: PAIN_FUNCTIONAL_ASSESSMENT: 0-10

## 2024-11-18 NOTE — PROGRESS NOTES
"Occupational Therapy Treatment  Patient Name: Mariia Rodarte  MRN: [id number]  OT Received on: 11/18/2024 OT Received On: 11/18/24      Time Calculation  Start Time: 0900  Stop Time: 0954  Time Calculation (min): 54 min  OT Therapeutic Procedures Time Entry  Therapeutic Exercise Time Entry: 54  Insurance:  Visit Number: 2 of 8  11/11/24 - [facility] / NO AUTH / 70 V YEAR OT/PT - 0 USED 11/11/24 / DED MET - OOP 5000 MET 3472.44 / DME COVERED / [facility] #  [id number] JS     Subjective   Problem List Items Addressed This Visit             ICD-10-CM       Musculoskeletal and Injuries    Osteoarthritis of right wrist M19.031     Referred by: Dr. Henry, follow up 1/13/25    Patient reports \" I am doing ok. I did fall this  past Saturday\".  OTR assessed hand. No increase in pain, bruising or swelling noted.Using ice the last 3 of 7 days. Heat 3 times a day.  Pt is wearing splint at all times without issue. Sleeve was replaced today that she wears underneath    Precautions: No strengthening until 12 weeks 12/27/24    Performing HEP?: Yes    Pain:  Pain Assessment  Pain Assessment: 0-10  0-10 (Numeric) Pain Score: 2  Pain Type: Acute pain, Surgical pain  Pain Location: Hand  Pain Orientation: Right  Pain Descriptors: Sore  Pain Frequency: Intermittent  Pain Onset: Gradual    Objective   AROM measured in R wrist today:  Supination at 80 ( was 75)  Pronation at 90 degrees ( same)  Wrist ext at 55 ( was 20)  Wrist Flexion at 50 ( was 40)  Radial deviation at 7( was 5)  Ulnar deviation at 23 ( was 15)   Physical Observation: healed scars, softening. No excessive redness or drainage.   Edema: trace in wrist and thumb  MKI at 8/10  Sensory: impaired  Numbness/Tingling: noted along volar /ulnar side of plam      Treatment: Splint fitting without issue.     Modalities:  AROM of wrist and fingers  while in Fluido x 10 min   educated pt ice bottle rolls x 4 min to reduce edema and inflammation at end of session.     Therapeutic " Exercise:  Pt completed Golf ball manipulation with min difficulty, added to HEP  Pt completed Wrist and thumb AROM with focus on hold at end ranges  Educated in and completed wrist alphabet x 1 rep  Educated in Playing card supination x 20 reps, added to HEP  Completed Finger tip to palm translation  with foam pieces , added to HEP    Post-tx pain: 1-2/10, decreased from start of session    Assessment/Plan  Pt motivated to return to function. Noted increase in AROM of wrist and fingers and decrease in pain from eval. Pt to follow through with increased ice use along with wrist and thumb AROM, continued splint wear at all times. Next visit check thumb AROM.       OP EDUCATION:  Education  Individual(s) Educated: Patient  Education Provided: POC discussed and agreed upon, Risk and benefits of OT discussed with patient or other, Edema control, Orthotics wearing schedule and precautions  Home Program: AROM, Fine motor tasks, Modalities, Orthotic wearing schedule, care and precautions  Risk and Benefits Discussed with Patient/Caregiver/Other: yes  Patient/Caregiver Demonstrated Understanding: yes  Plan of Care Discussed and Agreed Upon: yes  Patient Response to Education: Patient/Caregiver Verbalized Understanding of Information, Patient/Caregiver Performed Return Demonstration of Exercises/Activities, Patient/Caregiver Asked Appropriate Questions    Goals:  Active       OT Goals       1. Patient will increase R FA supination to at least 75 degrees for improved functional use of RUE.  (Met)       Start:  11/12/24    Expected End:  12/09/24    Resolved:  11/18/24         2. Patient will increase R wrist flexion/extension to at least 60 degrees in order to increase functional use of RUE. (Progressing)       Start:  11/12/24    Expected End:  12/09/24            3. Patient will increase R thumb MP an IP flexion by at least 15 degrees to increase functional use of thumb with pinching during ADL's. (Progressing)       Start:   11/12/24    Expected End:  12/09/24            4. Patient will increase R hand  strength to within 90% of L for improved functional use of R hand. (Progressing)       Start:  11/12/24    Expected End:  01/14/25            5. Patient will increase R hand pinch strength in all patterns to within 90% of L for improved functional use of R hand. (Progressing)       Start:  11/12/24    Expected End:  01/14/25            6. Patient will increase overall functional ease and independence AEB UEFI score of at least 65/80 for improved quality of life for patient. (Progressing)       Start:  11/12/24    Expected End:  01/14/25

## 2024-11-25 ENCOUNTER — TREATMENT (OUTPATIENT)
Dept: OCCUPATIONAL THERAPY | Facility: CLINIC | Age: 59
End: 2024-11-25
Payer: COMMERCIAL

## 2024-11-25 DIAGNOSIS — M19.031 OSTEOARTHRITIS OF RIGHT WRIST, UNSPECIFIED OSTEOARTHRITIS TYPE: ICD-10-CM

## 2024-11-25 PROCEDURE — 97110 THERAPEUTIC EXERCISES: CPT | Mod: GO,CO

## 2024-11-25 ASSESSMENT — PAIN - FUNCTIONAL ASSESSMENT: PAIN_FUNCTIONAL_ASSESSMENT: 0-10

## 2024-11-25 ASSESSMENT — PAIN SCALES - GENERAL: PAINLEVEL_OUTOF10: 1

## 2024-11-25 NOTE — PROGRESS NOTES
"Occupational Therapy Treatment  Patient Name: Mariia Rodarte  MRN: [id number]  OT Received on: 11/25/2024 OT Received On: 11/25/24      Insurance:  Visit Number: 3 of 8  11/11/24 - [facility] / NO AUTH / 70 V YEAR OT/PT - 0 USED 11/11/24 / DED MET - OOP 5000 MET 3472.44 / DME COVERED / [facility] #  [id number] JS   Time:  Time Calculation  Start Time: 0903  Stop Time: 0953  Time Calculation (min): 50 min  OT Therapeutic Procedures Time Entry  Therapeutic Exercise Time Entry: 50     Subjective   Problem List Items Addressed This Visit             ICD-10-CM       Musculoskeletal and Injuries    Osteoarthritis of right wrist M19.031     Referred by: Dr. Henry, follow up 1/13/25    Patient reports \"I am doing really good\". No increase in pain, bruising or swelling noted.Using ice the last 3 of 7 days. Heat 3 times a day.  Pt is wearing splint at all times without issue. Sleeve was replaced today that she wears underneath    Precautions: No strengthening until 12 weeks 12/27/24    Performing HEP?: Yes    Pain:  Pain Assessment  Pain Assessment: 0-10  0-10 (Numeric) Pain Score: 1  Pain Type: Acute pain, Surgical pain  Pain Location: Hand  Pain Orientation: Right (base of thumb)  Clinical Progression: Gradually improving    Objective   Thumb AROM measured today:       AROM       Right Left   THUMB CMC Extension/Flexion 0/15 ( was 10) WFL     MP Extension/Flexion 0/30 ( was 20) WFL     IP Extension/Flexion 0/40( was 25) WFL     AROM measured in R wrist today:  Supination at 90 ( was 80)  Pronation at 90 degrees ( same)  Wrist ext at 55 ( same)  Wrist Flexion at 60 ( was 50)  Radial deviation at 13( was 7)  Ulnar deviation at 25 ( was 23)   Physical Observation: healed scars, softening. No excessive redness or drainage.   Edema: trace in wrist and thumb  MKI at 9/10 ( was 8/10)  Sensory: impaired  Numbness/Tingling:  continues to be noted along volar /ulnar side of palm. Pt completing re-sensitization as educated in previous " session      Treatment:   Provided patient with information about paraffin bath for pain control at home  No problems with splint fit, provided with replacement glove for under splint.     Modalities:  AROM of wrist and fingers  while in Fluido x 10 min   Ice applied x 5  min to reduce edema and inflammation at end of session.     Therapeutic Exercise:  Initiated 1# wrist PRE's x 10 reps for supination/pronation, radial and ulnar deviation and wrist flexion and ext with palm up and palm down. To continue as HEP, adding reps as tolerated  Pt completed Wrist and thumb AROM with focus on hold at end ranges  Completed Juxticiser for wrist AROM  Completed Finger tip to palm translation  with marbles     Post-tx pain: 1/10, unchanged from start of session. Mod fatigue    Assessment/Plan  Pt motivated to return to function. Noted increase in  all movements AROM of wrist , thumb and fingers and decrease in pain from last session. . Pt to follow through with modality  use along with  added wrist MMS and thumb AROM, continued splint wear at all times. Next visit check UEFI.       OP EDUCATION:  Education  Individual(s) Educated: Patient  Education Provided: POC discussed and agreed upon, Risk and benefits of OT discussed with patient or other, Other (scar massage)  Home Program: AROM, Strengthening, Fine motor tasks, Wound/scar care, Orthotic wearing schedule, care and precautions, Modalities, Edema control (wrist MMS only)  Risk and Benefits Discussed with Patient/Caregiver/Other: yes  Patient/Caregiver Demonstrated Understanding: yes  Plan of Care Discussed and Agreed Upon: yes  Patient Response to Education: Patient/Caregiver Verbalized Understanding of Information, Patient/Caregiver Performed Return Demonstration of Exercises/Activities, Patient/Caregiver Asked Appropriate Questions    Goals:  Active       OT Goals       1. Patient will increase R FA supination to at least 75 degrees for improved functional use of RUE.   (Met)       Start:  11/12/24    Expected End:  12/09/24    Resolved:  11/18/24         2. Patient will increase R wrist flexion/extension to at least 60 degrees in order to increase functional use of RUE. (Progressing)       Start:  11/12/24    Expected End:  12/09/24            3. Patient will increase R thumb MP an IP flexion by at least 15 degrees to increase functional use of thumb with pinching during ADL's. (Progressing)       Start:  11/12/24    Expected End:  12/09/24            4. Patient will increase R hand  strength to within 90% of L for improved functional use of R hand. (Progressing)       Start:  11/12/24    Expected End:  01/14/25            5. Patient will increase R hand pinch strength in all patterns to within 90% of L for improved functional use of R hand. (Progressing)       Start:  11/12/24    Expected End:  01/14/25            6. Patient will increase overall functional ease and independence AEB UEFI score of at least 65/80 for improved quality of life for patient. (Progressing)       Start:  11/12/24    Expected End:  01/14/25

## 2024-12-02 ENCOUNTER — APPOINTMENT (OUTPATIENT)
Dept: OCCUPATIONAL THERAPY | Facility: CLINIC | Age: 59
End: 2024-12-02
Payer: COMMERCIAL

## 2024-12-09 ENCOUNTER — TREATMENT (OUTPATIENT)
Dept: OCCUPATIONAL THERAPY | Facility: CLINIC | Age: 59
End: 2024-12-09
Payer: COMMERCIAL

## 2024-12-09 DIAGNOSIS — M19.031 OSTEOARTHRITIS OF RIGHT WRIST, UNSPECIFIED OSTEOARTHRITIS TYPE: ICD-10-CM

## 2024-12-09 PROCEDURE — 97110 THERAPEUTIC EXERCISES: CPT | Mod: GO,CO

## 2024-12-09 ASSESSMENT — PAIN - FUNCTIONAL ASSESSMENT: PAIN_FUNCTIONAL_ASSESSMENT: 0-10

## 2024-12-09 ASSESSMENT — PAIN SCALES - GENERAL: PAINLEVEL_OUTOF10: 0 - NO PAIN

## 2024-12-09 NOTE — PROGRESS NOTES
"Occupational Therapy Treatment  Patient Name: Mariia Rodarte  MRN: [id number]  OT Received on: 12/9/2024 OT Received On: 12/09/24  Insurance:  Visit Number: 4  of 8  11/11/24 - [facility] / NO AUTH / 70 V YEAR OT/PT - 0 USED 11/11/24 / DED MET - OOP 5000 MET 3472.44 / DME COVERED / [employer] #  [id number] JS   Time:  Time Calculation  Start Time: 0902  Stop Time: 0955  Time Calculation (min): 53 min  OT Therapeutic Procedures Time Entry  Therapeutic Exercise Time Entry: 53     Subjective   Problem List Items Addressed This Visit             ICD-10-CM       Musculoskeletal and Injuries    Osteoarthritis of right wrist M19.031     Referred by: Dr. Henry, follow up 1/13/25    Patient reports \"I am doing ok, I have these lumps in my palm\" .Using heat and ice the last 7 of 7 days. No meds. Pt is wearing splint at all times without issue. Sleeve was replaced today that she wears underneath    Precautions: No strengthening until 12 weeks 12/27/24    Performing HEP?: Yes    Pain:  Pain Assessment  Pain Assessment: 0-10  0-10 (Numeric) Pain Score: 0 - No pain  Pain Type: Acute pain, Surgical pain  Pain Location: Hand (thumb)  Pain Orientation: Right  Clinical Progression: Gradually improving    Objective   Thumb AROM measured today:       AROM       Right Left   THUMB CMC Extension/Flexion 0/35 ( was 15) WFL     MP Extension/Flexion 0/35 ( was 30) WFL     IP Extension/Flexion 0/45(  was 40) WFL     AROM measured in R wrist today:  Supination at 90 ( same)   Pronation at 90 degrees ( same)  Wrist ext at 55 ( same)  Wrist Flexion at 65 ( was 60)  Radial deviation at 13( was 7)  Ulnar deviation at 28 ( was 25)   Physical Observation: healed scars, softening. No excessive redness or drainage.   Edema: trace continues in wrist and thumb  MKI at 10/10 ( was 9/10)  Sensory: impaired  Numbness/Tingling:  continues to be noted along lateral side of SF . Pt completing re-sensitization as educated in previous session  Treatment: "   Provided patient with information provided with replacement glove for under splint.     Modalities:  AROM of wrist and fingers  while in Fluido x 10 min   Ice applied x 5  min to reduce edema and inflammation at end of session.     Therapeutic Exercise:  Reviewed 1# wrist PRE's x 10 reps for supination/pronation, radial and ulnar deviation and wrist flexion and ext with palm up and palm down. To continue as HEP, adding reps as tolerated  Pt completed Wrist and thumb AROM with focus on hold at end ranges and focus on palmar abduction  Completed wrist roll up bar x 3 reps with 1# resistance  Educated pt in /completed ball rolls x 5 reps with 5 sec hold at end range to focus on wrist flexion and ext. Added to HEP  Educated pt in/completed  marker roll outs to reduce tone and muscle tightness in palm. Verbalized relief with this. To continue as part of HEP.    Post-tx pain: 0/10  Assessment/Plan  Pt motivated to return to function. Noted increase AROM of wrist , increased MKI, decrease in pain since last session. . Pt to follow through with modality  use along with  added wrist MMS and thumb AROM, continued splint wear at all times.To progress as protocol allows, MMS on 12/27/24.      OP EDUCATION:  Education  Individual(s) Educated: Patient  Education Provided: Risk and benefits of OT discussed with patient or other, POC discussed and agreed upon, Edema control  Home Program: AROM, Strengthening  Risk and Benefits Discussed with Patient/Caregiver/Other: yes  Patient/Caregiver Demonstrated Understanding: yes  Plan of Care Discussed and Agreed Upon: yes  Patient Response to Education: Patient/Caregiver Verbalized Understanding of Information, Patient/Caregiver Performed Return Demonstration of Exercises/Activities, Patient/Caregiver Asked Appropriate Questions    Goals:  Active       OT Goals       1. Patient will increase R FA supination to at least 75 degrees for improved functional use of RUE.  (Met)       Start:   11/12/24    Expected End:  12/09/24    Resolved:  11/18/24         2. Patient will increase R wrist flexion/extension to at least 60 degrees in order to increase functional use of RUE. (Progressing)       Start:  11/12/24    Expected End:  12/09/24            3. Patient will increase R thumb MP an IP flexion by at least 15 degrees to increase functional use of thumb with pinching during ADL's. (Progressing)       Start:  11/12/24    Expected End:  12/09/24            4. Patient will increase R hand  strength to within 90% of L for improved functional use of R hand. (Progressing)       Start:  11/12/24    Expected End:  01/14/25            5. Patient will increase R hand pinch strength in all patterns to within 90% of L for improved functional use of R hand. (Progressing)       Start:  11/12/24    Expected End:  01/14/25            6. Patient will increase overall functional ease and independence AEB UEFI score of at least 65/80 for improved quality of life for patient. (Progressing)       Start:  11/12/24    Expected End:  01/14/25

## 2024-12-16 ENCOUNTER — TREATMENT (OUTPATIENT)
Dept: OCCUPATIONAL THERAPY | Facility: CLINIC | Age: 59
End: 2024-12-16
Payer: COMMERCIAL

## 2024-12-16 DIAGNOSIS — M19.031 OSTEOARTHRITIS OF RIGHT WRIST, UNSPECIFIED OSTEOARTHRITIS TYPE: ICD-10-CM

## 2024-12-16 PROCEDURE — 97110 THERAPEUTIC EXERCISES: CPT | Mod: GO,CO

## 2024-12-16 ASSESSMENT — PAIN SCALES - GENERAL: PAINLEVEL_OUTOF10: 0 - NO PAIN

## 2024-12-16 ASSESSMENT — PAIN - FUNCTIONAL ASSESSMENT: PAIN_FUNCTIONAL_ASSESSMENT: 0-10

## 2024-12-16 NOTE — PROGRESS NOTES
"Occupational Therapy Treatment  Patient Name: Mariia Rodarte  MRN: [id number]  OT Received on: 12/16/2024 OT Received On: 12/16/24  Insurance:  Visit Number: 5  of 8  11/11/24 - [facility] / NO AUTH / 70 V YEAR OT/PT - 0 USED 11/11/24 / DED MET - OOP 5000 MET 3472.44 / DME COVERED / [facility] #  [id number] JS   Time:  Time Calculation  Start Time: 0903  Stop Time: 0952  Time Calculation (min): 49 min  OT Therapeutic Procedures Time Entry  Therapeutic Exercise Time Entry: 49     Subjective   Problem List Items Addressed This Visit             ICD-10-CM       Musculoskeletal and Injuries    Osteoarthritis of right wrist M19.031     Referred by: Dr. Henry, follow up 1/13/25    Patient reports \"I am doing ok. I have been doing my exercises. It is still a struggle.  \" .Using heat , paraffin and ice the last 7 of 7 days. No meds. Pt is wearing splint at all times without issue. Sleeve was replaced today that she wears underneath    Precautions: No strengthening of thumb until 12 weeks 12/27/24    Performing HEP?: Yes    Pain:0/10 at rest,  2/10 with movement only at base of thumb  Pain Assessment  Pain Assessment: 0-10  0-10 (Numeric) Pain Score: 0 - No pain  Pain Type: Acute pain, Surgical pain  Pain Location: Other (Comment) (thumb)  Pain Orientation: Right  Clinical Progression: Rapidly improving    Objective   Thumb AROM measured today:       AROM       Right Left   THUMB CMC Extension/Flexion 0/35 ( was 15) WFL     MP Extension/Flexion 0/45( was 35) WFL     IP Extension/Flexion 0/60(  was 45) WFL   Physical Observation: healed scars, softening. No excessive redness or drainage.   Edema: trace continues in wrist and thumb  MKI at 10/10   Sensory: intact  Numbness/Tingling:  no longer noted along lateral side of SF . Pt completing re-sensitization as educated in previous session  Treatment:     Modalities:  AROM of wrist and fingers  while in Fluido x 10 min to promote muscle movement during session.   Ice applied x 5  " min to reduce edema and inflammation at end of session. Educated in increased ice use due to slight increase in pain.     Therapeutic Exercise:  Initiated 2# ( was 1# ) PRE's x 8 reps for supination/pronation, radial and ulnar deviation and wrist flexion and ext with palm up and palm down. To continue as HEP, adding reps as tolerated. If pain increases, decrease to 1#, added reps as tolerated.   Reviewed thumb AROM with focus on hold at end ranges and focus on palmar abduction  Completed wrist roll up bar x 3 reps with  2# ( was 1#) resistance  Reviewed ball rolls/prayer stretch x 5 reps with 5 sec hold at end range to focus on wrist flexion and ext. To continue as part of HEP  Reviewed / educated and performed marker roll outs /manual massage to reduce tone and muscle tightness in palm. Verbalized relief with this. To continue as part of HEP.    Post-tx pain: 3-4/10, slightly increased since start of session.  Assessment/Plan  Pt motivated to return to function. Noted decreased numbness.  Increased thumb AROM  since last session. . Pt to follow through with modality  use along with increased reps with 1#, as tolerated with 2# for wrist only and  continued splint wear at all times.To progress as protocol allows, MMS of thumb  on 12/27/24.    OP EDUCATION:  Education  Individual(s) Educated: Patient  Education Provided: Risk and benefits of OT discussed with patient or other, POC discussed and agreed upon, Edema control  Home Program: AAROM, Tendon gliding  Equipment:  (1-2#)  Risk and Benefits Discussed with Patient/Caregiver/Other: yes  Patient/Caregiver Demonstrated Understanding: yes  Plan of Care Discussed and Agreed Upon: yes  Patient Response to Education: Patient/Caregiver Verbalized Understanding of Information, Patient/Caregiver Asked Appropriate Questions, Patient/Caregiver Performed Return Demonstration of Exercises/Activities    Goals:  Active       OT Goals       1. Patient will increase R FA supination  to at least 75 degrees for improved functional use of RUE.  (Met)       Start:  11/12/24    Expected End:  12/09/24    Resolved:  11/18/24         2. Patient will increase R wrist flexion/extension to at least 60 degrees in order to increase functional use of RUE. (Progressing)       Start:  11/12/24    Expected End:  12/09/24            3. Patient will increase R thumb MP an IP flexion by at least 15 degrees to increase functional use of thumb with pinching during ADL's. (Progressing)       Start:  11/12/24    Expected End:  12/09/24            4. Patient will increase R hand  strength to within 90% of L for improved functional use of R hand. (Progressing)       Start:  11/12/24    Expected End:  01/14/25            5. Patient will increase R hand pinch strength in all patterns to within 90% of L for improved functional use of R hand. (Progressing)       Start:  11/12/24    Expected End:  01/14/25            6. Patient will increase overall functional ease and independence AEB UEFI score of at least 65/80 for improved quality of life for patient. (Progressing)       Start:  11/12/24    Expected End:  01/14/25

## 2024-12-20 ENCOUNTER — APPOINTMENT (OUTPATIENT)
Dept: GASTROENTEROLOGY | Facility: CLINIC | Age: 59
End: 2024-12-20
Payer: COMMERCIAL

## 2024-12-23 ENCOUNTER — TREATMENT (OUTPATIENT)
Dept: OCCUPATIONAL THERAPY | Facility: CLINIC | Age: 59
End: 2024-12-23
Payer: COMMERCIAL

## 2024-12-23 DIAGNOSIS — M19.031 OSTEOARTHRITIS OF RIGHT WRIST, UNSPECIFIED OSTEOARTHRITIS TYPE: ICD-10-CM

## 2024-12-23 PROCEDURE — 97022 WHIRLPOOL THERAPY: CPT | Mod: GO | Performed by: OCCUPATIONAL THERAPIST

## 2024-12-23 PROCEDURE — 97110 THERAPEUTIC EXERCISES: CPT | Mod: GO | Performed by: OCCUPATIONAL THERAPIST

## 2024-12-23 ASSESSMENT — PAIN SCALES - GENERAL: PAINLEVEL_OUTOF10: 1

## 2024-12-23 ASSESSMENT — PAIN - FUNCTIONAL ASSESSMENT: PAIN_FUNCTIONAL_ASSESSMENT: 0-10

## 2024-12-23 NOTE — PROGRESS NOTES
"Occupational Therapy Treatment  Patient Name: Mariia Rodarte  MRN: [id number]  OT Received on: 12/23/2024        Time Calculation  Start Time: 0845  Stop Time: 0930  Time Calculation (min): 45 min  OT Modalities Time Entry  Whirlpool Time Entry: 10  OT Therapeutic Procedures Time Entry  Therapeutic Exercise Time Entry: 35    Insurance:  Visit Number: 6 of 8  Insurance Type: [employer]; 70 visits/yr      Subjective   Problem List Items Addressed This Visit             ICD-10-CM    Osteoarthritis of right wrist M19.031     Current Problem/Reason for visit:  Patient is a 59-year-old female with 10 year history of worsening R wrist/thumb pain; patient underwent R CMC arthroplasy with FCR tendon transfer on 10/4/24 with Dr. Henry.     Referred by: Dr. Henry    Patient reports \"I haven't been as faithful with my exercises because of the holidays.\"    Precautions: Strengthening= 12 weeks    Performing HEP?: Yes    Pain:  Pain Assessment  Pain Assessment: 0-10  0-10 (Numeric) Pain Score: 1  Pain Location:  (Thumb/wrist)  Pain Orientation: Right  \"It's not really pain, it's achy.\"    Objective      (lbs) Right Left   1     2 20# 48#   3     4     5       Pinch Right Left   2-pt 2# NT   3-pt 4# 7#   Lateral 4# 11#       Physical Observation: Patient into clinic with short opponens splint donned; surgical site healed/flat; thickening at palm at RF; tendon clicking occasionally with thumb circumduction  Edema: +Mild at R thumb  Sensory: Intact  Numbness/Tingling: N/a      Treatment:    Modalities: Fluidotherapy in conjunction with AROM ther ex R FA/wrist/hand all planes to optimize joint mobility and comfort x10 minutes.    Therapeutic Exercise:  Therapist provided demonstration with verbal instruction for gentle hand strengthening with extra soft theraputty integrating composite gripping, individual digital flexion, composite extension, 2-pt and 3-pt pinching, thumb IP flexion, composite thumb flexion, thumb adduction, and " composite thumb extension; 10 reps each; written handout issued; added to HEP.        Post-tx pain: 1/10; R thumb    Assessment/Plan Patient exhibits good tolerance for initiation of strengthening this date; progressing steadily/as expected; will continue to advance intervention as indicated/tolerated to optimize functional use of R, dominant hand.      OP EDUCATION:  Education  Individual(s) Educated: Patient  Home Program: Strengthening    Goals:  Active       OT Goals       1. Patient will increase R FA supination to at least 75 degrees for improved functional use of RUE.  (Met)       Start:  11/12/24    Expected End:  12/09/24    Resolved:  11/18/24         2. Patient will increase R wrist flexion/extension to at least 60 degrees in order to increase functional use of RUE. (Progressing)       Start:  11/12/24    Expected End:  12/09/24            3. Patient will increase R thumb MP an IP flexion by at least 15 degrees to increase functional use of thumb with pinching during ADL's. (Progressing)       Start:  11/12/24    Expected End:  12/09/24            4. Patient will increase R hand  strength to within 90% of L for improved functional use of R hand. (Progressing)       Start:  11/12/24    Expected End:  01/14/25            5. Patient will increase R hand pinch strength in all patterns to within 90% of L for improved functional use of R hand. (Progressing)       Start:  11/12/24    Expected End:  01/14/25            6. Patient will increase overall functional ease and independence AEB UEFI score of at least 65/80 for improved quality of life for patient. (Progressing)       Start:  11/12/24    Expected End:  01/14/25

## 2025-01-06 ENCOUNTER — TREATMENT (OUTPATIENT)
Dept: OCCUPATIONAL THERAPY | Facility: CLINIC | Age: 60
End: 2025-01-06
Payer: COMMERCIAL

## 2025-01-06 NOTE — PROGRESS NOTES
"Occupational Therapy                 Therapy Communication Note    Patient Name: Mariia Rodarte \"Phan"  MRN: [id number]  Department:   Room: Room/bed info not found  Today's Date: 1/6/2025     Discipline: Occupational Therapy     Missed Visit Reason:  Patient cancelled due to illness.     Missed Time: Cancel    "

## 2025-01-13 ENCOUNTER — APPOINTMENT (OUTPATIENT)
Dept: ORTHOPEDIC SURGERY | Facility: CLINIC | Age: 60
End: 2025-01-13
Payer: COMMERCIAL

## 2025-01-13 DIAGNOSIS — M19.031 OSTEOARTHRITIS OF RIGHT WRIST, UNSPECIFIED OSTEOARTHRITIS TYPE: Primary | ICD-10-CM

## 2025-01-13 PROCEDURE — 1036F TOBACCO NON-USER: CPT | Performed by: ORTHOPAEDIC SURGERY

## 2025-01-13 PROCEDURE — 4010F ACE/ARB THERAPY RXD/TAKEN: CPT | Performed by: ORTHOPAEDIC SURGERY

## 2025-01-13 PROCEDURE — 99213 OFFICE O/P EST LOW 20 MIN: CPT | Performed by: ORTHOPAEDIC SURGERY

## 2025-01-13 ASSESSMENT — ENCOUNTER SYMPTOMS
CHILLS: 0
FEVER: 0
BRUISES/BLEEDS EASILY: 0
WHEEZING: 0
FATIGUE: 0
ARTHRALGIAS: 1
NUMBNESS: 1
SHORTNESS OF BREATH: 0

## 2025-01-13 ASSESSMENT — PAIN SCALES - GENERAL: PAINLEVEL_OUTOF10: 1

## 2025-01-13 ASSESSMENT — PAIN - FUNCTIONAL ASSESSMENT: PAIN_FUNCTIONAL_ASSESSMENT: 0-10

## 2025-01-13 NOTE — PROGRESS NOTES
Reason for Appointment  Chief Complaint   Patient presents with    Right Hand - Follow-up     History of Present Illness  Patient is a 59 y.o. female here today for follow-up evaluation of right hand juts over 3 months out from a right CMC arthroplasty on 10/4/24. We last saw the patient on 11/11/24 when she was 6 weeks s/p a right thumb CMC arthroplasty and we discussed no heavy pinching. She did OT. Today she reports her thumb is better than before surgery. She does report weakness. She is at 2 lbs in OT. Her pain is better. A week ago she started to get tingling and numbness of the right hand. 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 No recent falls or injuries. No other changes in past medical history, allergies, or medications.      Past Medical History:   Diagnosis Date    Anxiety     Hyperlipidemia     Hypertension     Osteoporosis     Type 2 diabetes mellitus        Past Surgical History:   Procedure Laterality Date    COLONOSCOPY      HYSTERECTOMY      partial hysterectomy       Medication Documentation Review Audit       Reviewed by Damaris Machado MA (Medical Assistant) on 01/13/25 at 0932      Medication Order Taking? Sig Documenting Provider Last Dose Status   alendronate (Fosamax) 70 mg tablet [id number] Yes Take 1 tablet (70 mg) by mouth every 7 days. 30 minutes before the first food beverage or medicine of the day with plain water  SATURDAYS Historical Provider, MD Past Month Active   atorvastatin (Lipitor) 20 mg tablet [id number] Yes Take 1 tablet (20 mg) by mouth once daily. for cholesterol Caterina Scott PA-C  Active   blood-glucose meter (Accu-Chek Guide Glucose Meter) misc [id number] Yes 1 each by in vitro route once daily. Caterina Scott PA-C Taking Active   calcium acetate (Phoslo) 667 mg capsule [id number] Yes Take 1 capsule (667 mg) by mouth 3 times a day. Historical Provider, MD More than a month Active   citalopram (CeleXA) 20 mg tablet [id number] Yes TAKE ONE TABLET BY MOUTH DAILY  "Caterina ZAMBRANO BHAVNA Scott 10/3/2024 Active   losartan (Cozaar) 25 mg tablet [id number] Yes TAKE ONE TABLET BY MOUTH ONCE DAILY Caterina ScottBHAVNA  Active   metFORMIN  mg 24 hr tablet [id number] Yes Take 2 tablets (1,000 mg) by mouth 2 times daily (morning and late afternoon). Do not crush, chew, or split. Caterina ZAMBRANO BHAVNA Scott 10/3/2024 Active                    Allergies   Allergen Reactions    Paxlovid [Nirmatrelvir-Ritonavir] Anaphylaxis and Swelling     FACE, LIPS AND THROAT STARTED TO CLOSE. CAME TO ER    Naproxen Hives and Rash     AND STOMACH ACHE.       Review of Systems   Constitutional:  Negative for chills, fatigue and fever.   Respiratory:  Negative for shortness of breath and wheezing.    Cardiovascular:  Negative for chest pain and leg swelling.   Musculoskeletal:  Positive for arthralgias.   Allergic/Immunologic: Negative for immunocompromised state.   Neurological:  Positive for numbness.   Hematological:  Does not bruise/bleed easily.       Exam   Incision looks excellent, excellent rom. No sig swelling. Thinking of the palm, right. Increased linear early dupuytrens.    Assessment   Arthritis of the right wrist    Plan   We discussed it will take her a year to gain full strength back. We discussed she no longer needs OT and she can gain her strength back with ADL\"s. She can discontinue therapy when she wants. We discussed not overdoing it in therapy. We discussed wearing the brace with heavy lifting. We discussed her dupuytrens is not uncommon after hand surgery, we dicussed this is reactive and no progressive. At this point she can follow up in 3 months.       I, Soco Borrero, attest that this documentation has been prepared under the direction and in the presence of Justen Henry MD.   By signing below, IJusten MD, personally performed the services described in this documentation. All medical record entries made by the scribjose were at my direction and in my presence. I have reviewed " the chart and agree that the record reflects my personal performance and is accurate and complete.

## 2025-01-20 ENCOUNTER — TREATMENT (OUTPATIENT)
Dept: OCCUPATIONAL THERAPY | Facility: CLINIC | Age: 60
End: 2025-01-20
Payer: COMMERCIAL

## 2025-01-20 DIAGNOSIS — M19.031 OSTEOARTHRITIS OF RIGHT WRIST, UNSPECIFIED OSTEOARTHRITIS TYPE: ICD-10-CM

## 2025-01-20 PROCEDURE — 97110 THERAPEUTIC EXERCISES: CPT | Mod: GO | Performed by: OCCUPATIONAL THERAPIST

## 2025-01-20 PROCEDURE — 97022 WHIRLPOOL THERAPY: CPT | Mod: GO | Performed by: OCCUPATIONAL THERAPIST

## 2025-01-20 ASSESSMENT — PAIN - FUNCTIONAL ASSESSMENT: PAIN_FUNCTIONAL_ASSESSMENT: 0-10

## 2025-01-20 ASSESSMENT — PAIN SCALES - GENERAL: PAINLEVEL_OUTOF10: 0 - NO PAIN

## 2025-01-20 NOTE — PROGRESS NOTES
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 Henry.     Referred by: Dr. Henry    Patient reports \"Things have been really good and Dr. Henry is really pleased.\"    Precautions: N/a    Performing HEP?: Yes    Pain:  Pain Assessment  Pain Assessment: 0-10  0-10 (Numeric) Pain Score: 0 - No pain    Objective     Current UEFI= 76/80; 5% impaired  UEFI at Evaluation= 35/80; 56% impaired    AROM compared to initial measurements:    AROM     Right Left   FOREARM Pronation 90 WFL    Supination 90 (+15) WFL   WRIST Extension 55 (+35) WFL    Flexion 80 (+40) WFL    Radial Deviation 15 (+10) WFL    Ulnar Deviation  40 (+25)      WFL         AROM     Right Left   THUMB CMC Extension/Flexion 0/25 WFL    MP Extension/Flexion 0/35 WFL    IP Extension/Flexion +10/60 WFL        (lbs) Right Left   1     2 31# (+11) 48#   3     4     5       Pinch Right Left   2-pt 5.5# (+3.5) 5.5#   3-pt 6.5# (+2.5) 7#   Lateral 7# (+3) 11#       Physical Observation: Patient into clinic with short opponens splint donned; surgical site healed/flat; thickening at palm at RF; tendon clicking occasionally with thumb circumduction  Edema: +Mild at R thumb  Sensory: Intact  Numbness/Tingling: N/a      Treatment:    Modalities: Fluidotherapy in conjunction with AROM ther ex " R FA/wrist/hand all planes to optimize joint mobility and comfort x10 minutes.    Therapeutic Exercise:  Reassessment completed; results discussed with patient.  Reviewed HEP; issued patient medium resistance theraputty to be gradually added to extra soft for progression of HEP.   Therapist educated patient on activity recommendations and resumption of pre-surgery activity.     Post-tx pain: 0/10    Assessment/Plan Patient has made significant functional gains and improvements in AROM and strength since SOC. Patient reports she is 75-80% back to baseline at this time. Patient is competent with HEP. Will proceed with discharge.      OP EDUCATION:  Education  Individual(s) Educated: Patient  Home Program: Strengthening (Functional activity)    Goals:  Active       OT Goals       1. Patient will increase R FA supination to at least 75 degrees for improved functional use of RUE.  (Met)       Start:  11/12/24    Expected End:  12/09/24    Resolved:  11/18/24         2. Patient will increase R wrist flexion/extension to at least 60 degrees in order to increase functional use of RUE. (Progressing)       Start:  11/12/24    Expected End:  12/09/24            3. Patient will increase R thumb MP an IP flexion by at least 15 degrees to increase functional use of thumb with pinching during ADL's. (Met)       Start:  11/12/24    Expected End:  12/09/24    Resolved:  01/20/25         4. Patient will increase R hand  strength to within 90% of L for improved functional use of R hand. (Progressing)       Start:  11/12/24    Expected End:  01/14/25            5. Patient will increase R hand pinch strength in all patterns to within 90% of L for improved functional use of R hand. (Progressing)       Start:  11/12/24    Expected End:  01/14/25            6. Patient will increase overall functional ease and independence AEB UEFI score of at least 65/80 for improved quality of life for patient. (Met)       Start:  11/12/24    Expected  End:  01/14/25    Resolved:  01/20/25

## 2025-03-06 ENCOUNTER — APPOINTMENT (OUTPATIENT)
Dept: PRIMARY CARE | Facility: CLINIC | Age: 60
End: 2025-03-06
Payer: COMMERCIAL

## 2025-04-14 ENCOUNTER — APPOINTMENT (OUTPATIENT)
Dept: ORTHOPEDIC SURGERY | Facility: CLINIC | Age: 60
End: 2025-04-14
Payer: COMMERCIAL

## 2025-04-14 DIAGNOSIS — M19.031 OSTEOARTHRITIS OF RIGHT WRIST, UNSPECIFIED OSTEOARTHRITIS TYPE: Primary | ICD-10-CM

## 2025-04-14 PROCEDURE — 4010F ACE/ARB THERAPY RXD/TAKEN: CPT | Performed by: ORTHOPAEDIC SURGERY

## 2025-04-14 PROCEDURE — 99213 OFFICE O/P EST LOW 20 MIN: CPT | Performed by: ORTHOPAEDIC SURGERY

## 2025-04-14 ASSESSMENT — ENCOUNTER SYMPTOMS
SHORTNESS OF BREATH: 0
ARTHRALGIAS: 1
FATIGUE: 0
NUMBNESS: 1
FEVER: 0
BRUISES/BLEEDS EASILY: 0
CHILLS: 0
WHEEZING: 0

## 2025-04-14 ASSESSMENT — PAIN SCALES - GENERAL: PAINLEVEL_OUTOF10: 0 - NO PAIN

## 2025-04-14 ASSESSMENT — PAIN - FUNCTIONAL ASSESSMENT: PAIN_FUNCTIONAL_ASSESSMENT: 0-10

## 2025-04-14 NOTE — PROGRESS NOTES
Reason for Appointment  Chief Complaint   Patient presents with    Right Wrist - Follow-up     History of Present Illness  Patient is a 60 y.o. female here today for follow-up evaluation of right hand over 6 months out from a right CMC arthroplasty on 10/4/24 . We last saw the patient on 1/13/25 when she was 3 months out and we discussed wearing the brace with heavy lifting. Her last day of OT was 1/20/25. Today she states her thumb is doing great. She is doing more and getting stronger, lifting. She has excellent rom.     She does report numbness and tingling. It feels like a rubber band (pulling) in her hand. She went back to work three weeks ago. She has been painting and using a cock gun. She is left handed. Her numbness and tingling symptoms started after she returned to work and started painting. No recent falls or injuries. No other changes in past medical history, allergies, or medications.        Past Medical History:   Diagnosis Date    Anxiety     Hyperlipidemia     Hypertension     Osteoporosis     Type 2 diabetes mellitus        Past Surgical History:   Procedure Laterality Date    COLONOSCOPY      HYSTERECTOMY      partial hysterectomy       Medication Documentation Review Audit       Reviewed by Justen Henry MD (Physician) on 04/14/25 at 0838      Medication Order Taking? Sig Documenting Provider Last Dose Status   alendronate (Fosamax) 70 mg tablet [id number] No Take 1 tablet (70 mg) by mouth every 7 days. 30 minutes before the first food beverage or medicine of the day with plain water  SATURDAYS Historical Provider, MD Past Month Active   atorvastatin (Lipitor) 20 mg tablet [id number]  Take 1 tablet (20 mg) by mouth once daily. for cholesterol Caterina KENYA Scott PA-C  Active   blood-glucose meter (Accu-Chek Guide Glucose Meter) misc [id number] No 1 each by in vitro route once daily. Caterina KENYA Scott PA-C Taking Active   calcium acetate (Phoslo) 667 mg capsule [id number] No Take 1 capsule (667 mg) by  mouth 3 times a day. Historical Provider, MD More than a month Active   citalopram (CeleXA) 20 mg tablet [id number] No TAKE ONE TABLET BY MOUTH DAILY Caterina KENYA BHAVNA Scott 10/3/2024 Active   losartan (Cozaar) 25 mg tablet [id number]  TAKE ONE TABLET BY MOUTH ONCE DAILY Caterina KENYA Scott PA-C  Active   metFORMIN  mg 24 hr tablet [id number] No Take 2 tablets (1,000 mg) by mouth 2 times daily (morning and late afternoon). Do not crush, chew, or split. Caterina KENYA Scott PA-C 10/3/2024 Active                    Allergies   Allergen Reactions    Paxlovid [Nirmatrelvir-Ritonavir] Anaphylaxis and Swelling     FACE, LIPS AND THROAT STARTED TO CLOSE. CAME TO ER    Naproxen Hives and Rash     AND STOMACH ACHE.       Review of Systems   Constitutional:  Negative for chills, fatigue and fever.   Respiratory:  Negative for shortness of breath and wheezing.    Cardiovascular:  Negative for chest pain and leg swelling.   Musculoskeletal:  Positive for arthralgias.   Allergic/Immunologic: Negative for immunocompromised state.   Neurological:  Positive for numbness.   Hematological:  Does not bruise/bleed easily.       Exam   Negative tinel's median nerve. She does have dupuytren's in the palm. Excisions look excellent. Slight extension at the mp joint. Good position to the small finger. No instability. Excellent rom. Good pulses and sensation.     Assessment   Arthritis of the right wrist     Plan     We discussed stretching and not overdoing it with the hand. We discussed her becoming more active she is getting some tendinitis in the hand.     I, Soco Borrero, attest that this documentation has been prepared under the direction and in the presence of Justen Henry MD.   By signing below, I, Justen Henry MD, personally performed the services described in this documentation. All medical record entries made by the scribe were at my direction and in my presence. I have reviewed the chart and agree that the record reflects my  personal performance and is accurate and complete.

## 2025-04-18 DIAGNOSIS — I10 PRIMARY HYPERTENSION: ICD-10-CM

## 2025-04-18 RX ORDER — LOSARTAN POTASSIUM 25 MG/1
25 TABLET ORAL DAILY
Qty: 90 TABLET | Refills: 3 | Status: SHIPPED | OUTPATIENT
Start: 2025-04-18

## 2025-04-29 ENCOUNTER — OFFICE VISIT (OUTPATIENT)
Dept: PRIMARY CARE | Facility: CLINIC | Age: 60
End: 2025-04-29
Payer: COMMERCIAL

## 2025-04-29 VITALS
WEIGHT: 144.8 LBS | HEART RATE: 91 BPM | OXYGEN SATURATION: 98 % | SYSTOLIC BLOOD PRESSURE: 134 MMHG | DIASTOLIC BLOOD PRESSURE: 84 MMHG | BODY MASS INDEX: 24.85 KG/M2

## 2025-04-29 DIAGNOSIS — N95.9 MENOPAUSAL DISORDER: ICD-10-CM

## 2025-04-29 DIAGNOSIS — F43.23 ADJUSTMENT DISORDER WITH MIXED ANXIETY AND DEPRESSED MOOD: ICD-10-CM

## 2025-04-29 DIAGNOSIS — E55.9 VITAMIN D DEFICIENCY: ICD-10-CM

## 2025-04-29 DIAGNOSIS — E78.2 MIXED HYPERLIPIDEMIA: ICD-10-CM

## 2025-04-29 DIAGNOSIS — Z00.00 ROUTINE MEDICAL EXAM: Primary | ICD-10-CM

## 2025-04-29 DIAGNOSIS — I10 PRIMARY HYPERTENSION: ICD-10-CM

## 2025-04-29 DIAGNOSIS — Z13.89 SCREENING FOR HEMATURIA OR PROTEINURIA: ICD-10-CM

## 2025-04-29 DIAGNOSIS — E11.9 TYPE 2 DIABETES MELLITUS WITHOUT COMPLICATION, WITHOUT LONG-TERM CURRENT USE OF INSULIN: ICD-10-CM

## 2025-04-29 DIAGNOSIS — Z23 ENCOUNTER FOR IMMUNIZATION: ICD-10-CM

## 2025-04-29 DIAGNOSIS — Z12.31 BREAST CANCER SCREENING BY MAMMOGRAM: ICD-10-CM

## 2025-04-29 DIAGNOSIS — E78.5 DYSLIPIDEMIA: ICD-10-CM

## 2025-04-29 PROBLEM — M19.031 OSTEOARTHRITIS OF RIGHT WRIST: Status: RESOLVED | Noted: 2024-09-16 | Resolved: 2025-04-29

## 2025-04-29 LAB — POC HEMOGLOBIN A1C: 5.6 % (ref 4.2–6.5)

## 2025-04-29 PROCEDURE — 90471 IMMUNIZATION ADMIN: CPT | Performed by: PHYSICIAN ASSISTANT

## 2025-04-29 PROCEDURE — 99396 PREV VISIT EST AGE 40-64: CPT | Performed by: PHYSICIAN ASSISTANT

## 2025-04-29 PROCEDURE — 83036 HEMOGLOBIN GLYCOSYLATED A1C: CPT | Performed by: PHYSICIAN ASSISTANT

## 2025-04-29 PROCEDURE — 3079F DIAST BP 80-89 MM HG: CPT | Performed by: PHYSICIAN ASSISTANT

## 2025-04-29 PROCEDURE — 3044F HG A1C LEVEL LT 7.0%: CPT | Performed by: PHYSICIAN ASSISTANT

## 2025-04-29 PROCEDURE — 3075F SYST BP GE 130 - 139MM HG: CPT | Performed by: PHYSICIAN ASSISTANT

## 2025-04-29 PROCEDURE — 90677 PCV20 VACCINE IM: CPT | Performed by: PHYSICIAN ASSISTANT

## 2025-04-29 PROCEDURE — 1036F TOBACCO NON-USER: CPT | Performed by: PHYSICIAN ASSISTANT

## 2025-04-29 PROCEDURE — 4010F ACE/ARB THERAPY RXD/TAKEN: CPT | Performed by: PHYSICIAN ASSISTANT

## 2025-04-29 RX ORDER — LOSARTAN POTASSIUM 25 MG/1
25 TABLET ORAL DAILY
Qty: 90 TABLET | Refills: 3 | Status: SHIPPED | OUTPATIENT
Start: 2025-04-29

## 2025-04-29 RX ORDER — ATORVASTATIN CALCIUM 20 MG/1
20 TABLET, FILM COATED ORAL DAILY
Qty: 90 TABLET | Refills: 3 | Status: SHIPPED | OUTPATIENT
Start: 2025-04-29

## 2025-04-29 RX ORDER — CITALOPRAM 20 MG/1
30 TABLET, FILM COATED ORAL DAILY
Qty: 135 TABLET | Refills: 1 | Status: SHIPPED | OUTPATIENT
Start: 2025-04-29 | End: 2025-10-26

## 2025-04-29 ASSESSMENT — PATIENT HEALTH QUESTIONNAIRE - PHQ9
SUM OF ALL RESPONSES TO PHQ QUESTIONS 1-9: 12
2. FEELING DOWN, DEPRESSED OR HOPELESS: MORE THAN HALF THE DAYS
8. MOVING OR SPEAKING SO SLOWLY THAT OTHER PEOPLE COULD HAVE NOTICED. OR THE OPPOSITE, BEING SO FIGETY OR RESTLESS THAT YOU HAVE BEEN MOVING AROUND A LOT MORE THAN USUAL: NEARLY EVERY DAY
5. POOR APPETITE OR OVEREATING: NOT AT ALL
3. TROUBLE FALLING OR STAYING ASLEEP OR SLEEPING TOO MUCH: NEARLY EVERY DAY
6. FEELING BAD ABOUT YOURSELF - OR THAT YOU ARE A FAILURE OR HAVE LET YOURSELF OR YOUR FAMILY DOWN: NOT AT ALL
1. LITTLE INTEREST OR PLEASURE IN DOING THINGS: SEVERAL DAYS
10. IF YOU CHECKED OFF ANY PROBLEMS, HOW DIFFICULT HAVE THESE PROBLEMS MADE IT FOR YOU TO DO YOUR WORK, TAKE CARE OF THINGS AT HOME, OR GET ALONG WITH OTHER PEOPLE: SOMEWHAT DIFFICULT
4. FEELING TIRED OR HAVING LITTLE ENERGY: NEARLY EVERY DAY
9. THOUGHTS THAT YOU WOULD BE BETTER OFF DEAD, OR OF HURTING YOURSELF: NOT AT ALL
SUM OF ALL RESPONSES TO PHQ9 QUESTIONS 1 AND 2: 3
7. TROUBLE CONCENTRATING ON THINGS, SUCH AS READING THE NEWSPAPER OR WATCHING TELEVISION: NOT AT ALL

## 2025-04-29 ASSESSMENT — PAIN SCALES - GENERAL: PAINLEVEL_OUTOF10: 0-NO PAIN

## 2025-04-29 ASSESSMENT — ENCOUNTER SYMPTOMS
HEADACHES: 0
BLOOD IN STOOL: 1
SHORTNESS OF BREATH: 0
ABDOMINAL PAIN: 1
DIZZINESS: 1
LIGHT-HEADEDNESS: 0

## 2025-04-29 NOTE — PROGRESS NOTES
"Subjective   Patient ID: Calli Rodarte is a 60 y.o. female who presents for Annual Exam (Pt  is here for physical / nr/Pt would like to discuss metformin ).    HPI   T2DM: A1c 5.7 --> 5.8 --> 5.6. Managed with 1000mg metformin BID. Does not absorb the metformin and had 4 whole pills in the toilet the other day. Has seen GI for this who does not have an explanation. Has been experiencing rectal bleeding and was encouraged to schedule colonoscopy if still occurring.   Foot exam - 4/29/25   Eye exam - overdue, plans on scheduling   Microalbumin - overdue   PNA vaccine - PPSV 3/1/22, [id number] given today   Statin - 20mg atorvastatin   ACE - 25mg losartan    Mood - stable on 20mg celexa. Her stepdaughter and 4yo grandson just moved in w/her a few mos ago. Anxiety/stress has significantly worsened. She would like to make adjustments to her medication.    Patient Health Questionnaire-9 Score: 12   Patient Health Questionnaire-2 Score: 3 (4/29/2025 10:20 AM).  This indicates a positive screen but may not meet the criteria for a clinical depression diagnosis. Symptoms were reviewed with Mariia \"Calli\".  Follow-up within the next 3 months is recommended to re-assess symptoms and monitor mental health status.    Osteoporosis - on 70mg alendronate weekly since 4/12/22.    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    Eyes:  Negative for visual disturbance.   Respiratory:  Negative for shortness of breath.    Cardiovascular:  Negative for chest pain.   Gastrointestinal:  Positive for abdominal pain and blood in stool.   Neurological:  Positive for dizziness (occasional). Negative for light-headedness and headaches.       Objective   /84   Pulse 91   Wt 65.7 kg (144 lb 12.8 oz)   SpO2 98%   BMI 24.85 kg/m²     Physical Exam  Vitals reviewed. "   Constitutional:       Appearance: Normal appearance.   HENT:      Head: Normocephalic and atraumatic.      Right Ear: Tympanic membrane and ear canal normal.      Left Ear: Tympanic membrane and ear canal normal.      Nose: Nose normal.      Mouth/Throat:      Mouth: Mucous membranes are moist.      Pharynx: No oropharyngeal exudate.   Eyes:      Extraocular Movements: Extraocular movements intact.      Conjunctiva/sclera: Conjunctivae normal.      Pupils: Pupils are equal, round, and reactive to light.   Cardiovascular:      Rate and Rhythm: Normal rate and regular rhythm.      Heart sounds: Normal heart sounds.   Pulmonary:      Effort: Pulmonary effort is normal.      Breath sounds: Normal breath sounds. No wheezing.   Abdominal:      General: There is no distension.      Palpations: Abdomen is soft.      Tenderness: There is no abdominal tenderness.   Musculoskeletal:         General: No tenderness.      Cervical back: Normal range of motion and neck supple.   Skin:     General: Skin is warm and dry.      Findings: No rash.   Neurological:      General: No focal deficit present.      Mental Status: She is alert. Mental status is at baseline.   Psychiatric:         Mood and Affect: Mood normal.         Assessment/Plan   Problem List Items Addressed This Visit           ICD-10-CM    Dyslipidemia E78.5    Relevant Medications    atorvastatin (Lipitor) 20 mg tablet    Other Relevant Orders    Lipid Panel    Type 2 diabetes mellitus without complication, without long-term current use of insulin E11.9    Relevant Orders    POCT glycosylated hemoglobin (Hb A1C) manually resulted (Completed)    Albumin-Creatinine Ratio, Urine Random    Comprehensive Metabolic Panel    Vitamin D deficiency E55.9    Relevant Orders    Vitamin D 25-Hydroxy,Total (for eval of Vitamin D levels)     Other Visit Diagnoses         Codes      Routine medical exam    -  Primary Z00.00      Screening for hematuria or proteinuria     Z13.89     Relevant Orders    Urinalysis with Reflex Culture and Microscopic      Breast cancer screening by mammogram     Z12.31    Relevant Orders    BI mammo bilateral screening tomosynthesis      Menopausal disorder     N95.9    Relevant Orders    XR DEXA bone density      Encounter for immunization     Z23    Relevant Orders    Pneumococcal conjugate vaccine, 20-valent (PREVNAR 20) (Completed)      Mixed hyperlipidemia     E78.2    Relevant Medications    atorvastatin (Lipitor) 20 mg tablet      Primary hypertension     I10    Relevant Medications    losartan (Cozaar) 25 mg tablet      Adjustment disorder with mixed anxiety and depressed mood     F43.23    Relevant Medications    citalopram (CeleXA) 20 mg tablet          Will hold metformin for now and recheck A1c in 3mos. Not even sure she's absorbing the medication. Encouraged to schedule colonoscopy.    Will increase celexa to 30mg. Advised to reach out in 1mo if still not feeling better.

## 2025-05-02 ENCOUNTER — HOSPITAL ENCOUNTER (OUTPATIENT)
Dept: RADIOLOGY | Facility: HOSPITAL | Age: 60
Discharge: HOME | End: 2025-05-02
Payer: COMMERCIAL

## 2025-05-02 DIAGNOSIS — N95.9 MENOPAUSAL DISORDER: ICD-10-CM

## 2025-05-02 PROCEDURE — 77080 DXA BONE DENSITY AXIAL: CPT

## 2025-05-03 ENCOUNTER — HOSPITAL ENCOUNTER (OUTPATIENT)
Dept: RADIOLOGY | Facility: HOSPITAL | Age: 60
Discharge: HOME | End: 2025-05-03
Payer: COMMERCIAL

## 2025-05-03 DIAGNOSIS — Z12.31 BREAST CANCER SCREENING BY MAMMOGRAM: ICD-10-CM

## 2025-05-03 PROCEDURE — 77067 SCR MAMMO BI INCL CAD: CPT | Performed by: STUDENT IN AN ORGANIZED HEALTH CARE EDUCATION/TRAINING PROGRAM

## 2025-05-03 PROCEDURE — 77067 SCR MAMMO BI INCL CAD: CPT

## 2025-05-03 PROCEDURE — 77063 BREAST TOMOSYNTHESIS BI: CPT

## 2025-05-03 PROCEDURE — 77063 BREAST TOMOSYNTHESIS BI: CPT | Performed by: STUDENT IN AN ORGANIZED HEALTH CARE EDUCATION/TRAINING PROGRAM

## 2025-05-04 LAB
25(OH)D3+25(OH)D2 SERPL-MCNC: 35 NG/ML (ref 30–100)
ALBUMIN SERPL-MCNC: 4.6 G/DL (ref 3.6–5.1)
ALBUMIN/CREAT UR: NORMAL
ALP SERPL-CCNC: 72 U/L (ref 37–153)
ALT SERPL-CCNC: 22 U/L (ref 6–29)
ANION GAP SERPL CALCULATED.4IONS-SCNC: 8 MMOL/L (CALC) (ref 7–17)
APPEARANCE UR: CLEAR
AST SERPL-CCNC: 22 U/L (ref 10–35)
BACTERIA #/AREA URNS HPF: ABNORMAL /HPF
BACTERIA UR CULT: ABNORMAL
BILIRUB SERPL-MCNC: 1.3 MG/DL (ref 0.2–1.2)
BILIRUB UR QL STRIP: NEGATIVE
BUN SERPL-MCNC: 15 MG/DL (ref 7–25)
CALCIUM SERPL-MCNC: 9.3 MG/DL (ref 8.6–10.4)
CHLORIDE SERPL-SCNC: 105 MMOL/L (ref 98–110)
CHOLEST SERPL-MCNC: 166 MG/DL
CHOLEST/HDLC SERPL: 2.6 (CALC)
CO2 SERPL-SCNC: 28 MMOL/L (ref 20–32)
COLOR UR: YELLOW
CREAT SERPL-MCNC: 0.75 MG/DL (ref 0.5–1.05)
CREAT UR-MCNC: NORMAL MG/DL
EGFRCR SERPLBLD CKD-EPI 2021: 91 ML/MIN/1.73M2
GLUCOSE SERPL-MCNC: 110 MG/DL (ref 65–99)
GLUCOSE UR QL STRIP: NEGATIVE
HDLC SERPL-MCNC: 64 MG/DL
HGB UR QL STRIP: ABNORMAL
HYALINE CASTS #/AREA URNS LPF: ABNORMAL /LPF
KETONES UR QL STRIP: NEGATIVE
LDLC SERPL CALC-MCNC: 78 MG/DL (CALC)
LEUKOCYTE ESTERASE UR QL STRIP: ABNORMAL
MICROALBUMIN UR-MCNC: NORMAL
NITRITE UR QL STRIP: NEGATIVE
NONHDLC SERPL-MCNC: 102 MG/DL (CALC)
PH UR STRIP: 6.5 [PH] (ref 5–8)
POTASSIUM SERPL-SCNC: 4.3 MMOL/L (ref 3.5–5.3)
PROT SERPL-MCNC: 6.6 G/DL (ref 6.1–8.1)
PROT UR QL STRIP: NEGATIVE
RBC #/AREA URNS HPF: ABNORMAL /HPF
SERVICE CMNT-IMP: ABNORMAL
SODIUM SERPL-SCNC: 141 MMOL/L (ref 135–146)
SP GR UR STRIP: 1.01 (ref 1–1.03)
SQUAMOUS #/AREA URNS HPF: ABNORMAL /HPF
TRIGL SERPL-MCNC: 139 MG/DL
WBC #/AREA URNS HPF: ABNORMAL /HPF

## 2025-05-05 ENCOUNTER — TELEPHONE (OUTPATIENT)
Dept: PRIMARY CARE | Facility: CLINIC | Age: 60
End: 2025-05-05
Payer: COMMERCIAL

## 2025-05-05 DIAGNOSIS — E55.9 VITAMIN D DEFICIENCY: Primary | ICD-10-CM

## 2025-05-05 DIAGNOSIS — E11.9 TYPE 2 DIABETES MELLITUS WITHOUT COMPLICATION, WITHOUT LONG-TERM CURRENT USE OF INSULIN: ICD-10-CM

## 2025-05-05 DIAGNOSIS — R31.21 ASYMPTOMATIC MICROSCOPIC HEMATURIA: ICD-10-CM

## 2025-05-05 LAB
25(OH)D3+25(OH)D2 SERPL-MCNC: 35 NG/ML (ref 30–100)
ALBUMIN SERPL-MCNC: 4.6 G/DL (ref 3.6–5.1)
ALBUMIN/CREAT UR: 5 MG/G CREAT
ALP SERPL-CCNC: 72 U/L (ref 37–153)
ALT SERPL-CCNC: 22 U/L (ref 6–29)
ANION GAP SERPL CALCULATED.4IONS-SCNC: 8 MMOL/L (CALC) (ref 7–17)
APPEARANCE UR: CLEAR
AST SERPL-CCNC: 22 U/L (ref 10–35)
BACTERIA #/AREA URNS HPF: ABNORMAL /HPF
BACTERIA UR CULT: ABNORMAL
BILIRUB SERPL-MCNC: 1.3 MG/DL (ref 0.2–1.2)
BILIRUB UR QL STRIP: NEGATIVE
BUN SERPL-MCNC: 15 MG/DL (ref 7–25)
CALCIUM SERPL-MCNC: 9.3 MG/DL (ref 8.6–10.4)
CHLORIDE SERPL-SCNC: 105 MMOL/L (ref 98–110)
CHOLEST SERPL-MCNC: 166 MG/DL
CHOLEST/HDLC SERPL: 2.6 (CALC)
CO2 SERPL-SCNC: 28 MMOL/L (ref 20–32)
COLOR UR: YELLOW
CREAT SERPL-MCNC: 0.75 MG/DL (ref 0.5–1.05)
CREAT UR-MCNC: 73 MG/DL (ref 20–275)
EGFRCR SERPLBLD CKD-EPI 2021: 91 ML/MIN/1.73M2
GLUCOSE SERPL-MCNC: 110 MG/DL (ref 65–99)
GLUCOSE UR QL STRIP: NEGATIVE
HDLC SERPL-MCNC: 64 MG/DL
HGB UR QL STRIP: ABNORMAL
HYALINE CASTS #/AREA URNS LPF: ABNORMAL /LPF
KETONES UR QL STRIP: NEGATIVE
LDLC SERPL CALC-MCNC: 78 MG/DL (CALC)
LEUKOCYTE ESTERASE UR QL STRIP: ABNORMAL
MICROALBUMIN UR-MCNC: 0.4 MG/DL
NITRITE UR QL STRIP: NEGATIVE
NONHDLC SERPL-MCNC: 102 MG/DL (CALC)
PH UR STRIP: 6.5 [PH] (ref 5–8)
POTASSIUM SERPL-SCNC: 4.3 MMOL/L (ref 3.5–5.3)
PROT SERPL-MCNC: 6.6 G/DL (ref 6.1–8.1)
PROT UR QL STRIP: NEGATIVE
RBC #/AREA URNS HPF: ABNORMAL /HPF
SERVICE CMNT-IMP: ABNORMAL
SODIUM SERPL-SCNC: 141 MMOL/L (ref 135–146)
SP GR UR STRIP: 1.01 (ref 1–1.03)
SQUAMOUS #/AREA URNS HPF: ABNORMAL /HPF
TRIGL SERPL-MCNC: 139 MG/DL
WBC #/AREA URNS HPF: ABNORMAL /HPF

## 2025-05-05 NOTE — TELEPHONE ENCOUNTER
Called and spoke with pt, who verbally understands. Pt would like the referral placed for [facility]. Please placed for pt and she would like it mailed to her.

## 2025-05-05 NOTE — TELEPHONE ENCOUNTER
Bone density has worsened slightly but fracture risk has remained the same. Please make sure she is still taking alendronate. She can consider a physical therapy program like [facility] to work on exercises to improve bone density. Let me know if she would like a referral.

## 2025-05-05 NOTE — PROGRESS NOTES
Please let pt know I placed lab orders for her FU visit with me in November. (Not the one in July). Please mail to her. We will repeat urine sample and do bloodwork. Does not need to fast. Can go 1 week before her appt.

## 2025-05-23 ENCOUNTER — TELEMEDICINE (OUTPATIENT)
Dept: PRIMARY CARE | Facility: CLINIC | Age: 60
End: 2025-05-23
Payer: COMMERCIAL

## 2025-05-23 ENCOUNTER — HOSPITAL ENCOUNTER (OUTPATIENT)
Dept: RADIOLOGY | Facility: CLINIC | Age: 60
Discharge: HOME | End: 2025-05-23
Payer: COMMERCIAL

## 2025-05-23 ENCOUNTER — OFFICE VISIT (OUTPATIENT)
Dept: PRIMARY CARE | Facility: CLINIC | Age: 60
End: 2025-05-23
Payer: COMMERCIAL

## 2025-05-23 VITALS
BODY MASS INDEX: 24.72 KG/M2 | SYSTOLIC BLOOD PRESSURE: 140 MMHG | OXYGEN SATURATION: 94 % | WEIGHT: 144 LBS | HEART RATE: 123 BPM | DIASTOLIC BLOOD PRESSURE: 84 MMHG

## 2025-05-23 VITALS — TEMPERATURE: 101 F

## 2025-05-23 DIAGNOSIS — R06.02 SHORTNESS OF BREATH: ICD-10-CM

## 2025-05-23 DIAGNOSIS — R05.1 ACUTE COUGH: ICD-10-CM

## 2025-05-23 DIAGNOSIS — R50.9 FEBRILE ILLNESS: Primary | ICD-10-CM

## 2025-05-23 DIAGNOSIS — R00.0 TACHYCARDIA: ICD-10-CM

## 2025-05-23 DIAGNOSIS — G44.85 STABBING HEADACHE: ICD-10-CM

## 2025-05-23 LAB
POC BINAX EXPIRATION: NORMAL
POC BINAX NOW COVID SERIAL NUMBER: NORMAL
POC FLU A RESULT: NEGATIVE
POC FLU B RESULT: NEGATIVE
POC RSV PCR RESULT: NEGATIVE
POC SARS-COV-2 AG BINAX: NORMAL

## 2025-05-23 PROCEDURE — 99215 OFFICE O/P EST HI 40 MIN: CPT | Performed by: PHYSICIAN ASSISTANT

## 2025-05-23 PROCEDURE — 3044F HG A1C LEVEL LT 7.0%: CPT | Performed by: PHYSICIAN ASSISTANT

## 2025-05-23 PROCEDURE — 87502 INFLUENZA DNA AMP PROBE: CPT | Performed by: PHYSICIAN ASSISTANT

## 2025-05-23 PROCEDURE — 93000 ELECTROCARDIOGRAM COMPLETE: CPT | Performed by: PHYSICIAN ASSISTANT

## 2025-05-23 PROCEDURE — 87634 RSV DNA/RNA AMP PROBE: CPT | Performed by: PHYSICIAN ASSISTANT

## 2025-05-23 PROCEDURE — 87811 SARS-COV-2 COVID19 W/OPTIC: CPT | Performed by: PHYSICIAN ASSISTANT

## 2025-05-23 PROCEDURE — 1036F TOBACCO NON-USER: CPT | Performed by: PHYSICIAN ASSISTANT

## 2025-05-23 PROCEDURE — 3079F DIAST BP 80-89 MM HG: CPT | Performed by: PHYSICIAN ASSISTANT

## 2025-05-23 PROCEDURE — 3077F SYST BP >= 140 MM HG: CPT | Performed by: PHYSICIAN ASSISTANT

## 2025-05-23 PROCEDURE — 71046 X-RAY EXAM CHEST 2 VIEWS: CPT

## 2025-05-23 PROCEDURE — 4010F ACE/ARB THERAPY RXD/TAKEN: CPT | Performed by: PHYSICIAN ASSISTANT

## 2025-05-23 RX ORDER — ALBUTEROL SULFATE 90 UG/1
2 INHALANT RESPIRATORY (INHALATION) EVERY 4 HOURS PRN
Qty: 8.5 G | Refills: 5 | Status: SHIPPED | OUTPATIENT
Start: 2025-05-23 | End: 2026-05-23

## 2025-05-23 RX ORDER — INDOMETHACIN 50 MG/1
50 CAPSULE ORAL
Qty: 10 CAPSULE | Refills: 0 | Status: SHIPPED | OUTPATIENT
Start: 2025-05-23 | End: 2025-05-28

## 2025-05-23 RX ORDER — IPRATROPIUM BROMIDE AND ALBUTEROL SULFATE 2.5; .5 MG/3ML; MG/3ML
3 SOLUTION RESPIRATORY (INHALATION) ONCE
Status: COMPLETED | OUTPATIENT
Start: 2025-05-23 | End: 2025-05-23

## 2025-05-23 RX ORDER — BENZONATATE 200 MG/1
200 CAPSULE ORAL 3 TIMES DAILY PRN
Qty: 21 CAPSULE | Refills: 0 | Status: SHIPPED | OUTPATIENT
Start: 2025-05-23 | End: 2025-05-30

## 2025-05-23 RX ADMIN — IPRATROPIUM BROMIDE AND ALBUTEROL SULFATE 3 ML: 2.5; .5 SOLUTION RESPIRATORY (INHALATION) at 14:18

## 2025-05-23 ASSESSMENT — PAIN SCALES - GENERAL
PAINLEVEL_OUTOF10: 10-WORST PAIN EVER
PAINLEVEL_OUTOF10: 10-WORST PAIN EVER

## 2025-05-23 ASSESSMENT — ENCOUNTER SYMPTOMS
CHEST TIGHTNESS: 1
RHINORRHEA: 0
WHEEZING: 0
SINUS PAIN: 1
SORE THROAT: 0
FEVER: 1
SINUS PRESSURE: 1
MYALGIAS: 1
SHORTNESS OF BREATH: 0
HEADACHES: 1
FATIGUE: 1
COUGH: 1

## 2025-05-23 NOTE — PROGRESS NOTES
Subjective   Patient ID: Calli Rodarte is a 60 y.o. female who presents for Shortness of Breath (Pt is here for SOB, raspy cough, body aches, chest pressure, headaches since Wednesday / nr/Pt did not do a covid test / nr/Ismael had pneumonia last week ).    HPI   With patient's permission, this is a Telemedicine visit with video and audio. All issues as documented below were discussed and addressed but limited physical exam was performed. If it was felt that the patient should be evaluated via face-to-face office appointment(s) they were directed there.    Ismael developed PNA last week, tx w/amoxicillin and steroids.    Developed chest congestion, cough, body aches, fever. Tmax 101.5F. c/o sinus and chest congestion. Has chest tightness. Did not tolerate paxlovid in the past due to side effects of facial swelling.     Tylenol cold + flu multisymptom - acetaminophen, dextromethorphan, doxylamine    4 days of stabbing ice pick HA. Not alleviated w/excedrin, heating pad.       Review of Systems    Objective   Temp (!) 38.3 °C (101 °F)     Physical Exam  Constitutional:       Appearance: Normal appearance.   HENT:      Head: Normocephalic and atraumatic.   Eyes:      Conjunctiva/sclera: Conjunctivae normal.   Pulmonary:      Effort: Pulmonary effort is normal.   Musculoskeletal:      Cervical back: Normal range of motion and neck supple.   Skin:     Findings: No rash.   Neurological:      Mental Status: She is alert.   Psychiatric:         Mood and Affect: Mood normal.     Limited exam due to telemed visit.      Assessment/Plan   {Assess/PlanSmartLinks:[id number]}

## 2025-05-23 NOTE — PROGRESS NOTES
Subjective   Patient ID: Calli Rodarte is a 60 y.o. female who presents for No chief complaint on file..    HPI   Pt here w/fever, HA, body aches, dry cough, sinus/chest congestion. Tmax 101.5F. taking tylenol cold + flu (acetaminophen, dextromethorphan, doxylamine). Has also been experiencing 4 days of stabbing ice pick HA. Not alleviated w/excedrin, heating pad. Pt is a nonsmoker.    Grandson ill w/PNA last week, tx w/amoxicillin and steroids.        Review of Systems   Constitutional:  Positive for fatigue and fever.   HENT:  Positive for congestion, sinus pressure and sinus pain. Negative for ear pain, rhinorrhea and sore throat.    Respiratory:  Positive for cough and chest tightness. Negative for shortness of breath and wheezing.    Cardiovascular:  Negative for chest pain.   Musculoskeletal:  Positive for myalgias.   Skin:  Negative for rash.   Neurological:  Positive for headaches.       Objective   /84   Pulse (!) 123   Wt 65.3 kg (144 lb)   SpO2 94%   BMI 24.72 kg/m²     Physical Exam  Constitutional:       Appearance: Normal appearance.   HENT:      Head: Normocephalic and atraumatic.      Right Ear: Tympanic membrane and ear canal normal.      Left Ear: Tympanic membrane and ear canal normal.      Nose: Nose normal.      Mouth/Throat:      Mouth: Mucous membranes are moist.      Pharynx: No oropharyngeal exudate or posterior oropharyngeal erythema.   Eyes:      Conjunctiva/sclera: Conjunctivae normal.   Cardiovascular:      Rate and Rhythm: Regular rhythm. Tachycardia present.      Heart sounds: No murmur heard.  Pulmonary:      Effort: Pulmonary effort is normal.      Breath sounds: Normal breath sounds. No wheezing or rhonchi.   Musculoskeletal:      Cervical back: Normal range of motion and neck supple.   Skin:     General: Skin is warm and dry.      Findings: No rash.   Neurological:      General: No focal deficit present.      Mental Status: She is alert and oriented to person, place, and  time. Mental status is at baseline.      Cranial Nerves: No cranial nerve deficit.      Sensory: No sensory deficit.      Motor: No weakness.         Assessment/Plan   Problem List Items Addressed This Visit    None  Visit Diagnoses         Codes      Febrile illness    -  Primary R50.9    Relevant Orders    POCT BinaxNOW Covid-19 Ag Card manually resulted (Completed)    POCT ID NOW Influenza A/B manually resulted (Completed)    POCT ID NOW RSV manually resulted (Completed)      Tachycardia     R00.0    Relevant Orders    ECG 12 lead (Clinic Performed) (Completed)      Shortness of breath     R06.02    Relevant Orders    XR chest 2 views (Completed)      Acute cough     R05.1    Relevant Medications    benzonatate (Tessalon) 200 mg capsule    Other Relevant Orders    XR chest 2 views (Completed)      Stabbing headache     G44.85    Relevant Medications    indomethacin (Indocin) 50 mg capsule          NIH stroke scale 1 (mild tongue deviation). No focal deficits.     EKG w/sinus tachycardia.     pOx improved to 97% after breathing treatment. Lungs CTAB. Rx sent for albuterol. Advised to go to ED for any worsening of sxs    >40 mins spent on pt encounter today

## 2025-07-28 ENCOUNTER — OFFICE VISIT (OUTPATIENT)
Dept: PRIMARY CARE | Facility: CLINIC | Age: 60
End: 2025-07-28
Payer: COMMERCIAL

## 2025-07-28 VITALS
HEIGHT: 64 IN | OXYGEN SATURATION: 99 % | BODY MASS INDEX: 24.24 KG/M2 | WEIGHT: 142 LBS | DIASTOLIC BLOOD PRESSURE: 82 MMHG | HEART RATE: 85 BPM | SYSTOLIC BLOOD PRESSURE: 144 MMHG

## 2025-07-28 DIAGNOSIS — M65.342 TRIGGER RING FINGER OF LEFT HAND: ICD-10-CM

## 2025-07-28 DIAGNOSIS — E11.9 TYPE 2 DIABETES MELLITUS WITHOUT COMPLICATION, WITHOUT LONG-TERM CURRENT USE OF INSULIN: Primary | ICD-10-CM

## 2025-07-28 LAB — POC HEMOGLOBIN A1C: 5.5 % (ref 4.2–6.5)

## 2025-07-28 PROCEDURE — 3044F HG A1C LEVEL LT 7.0%: CPT | Performed by: PHYSICIAN ASSISTANT

## 2025-07-28 PROCEDURE — 4010F ACE/ARB THERAPY RXD/TAKEN: CPT | Performed by: PHYSICIAN ASSISTANT

## 2025-07-28 PROCEDURE — 99214 OFFICE O/P EST MOD 30 MIN: CPT | Performed by: PHYSICIAN ASSISTANT

## 2025-07-28 PROCEDURE — 3077F SYST BP >= 140 MM HG: CPT | Performed by: PHYSICIAN ASSISTANT

## 2025-07-28 PROCEDURE — 3008F BODY MASS INDEX DOCD: CPT | Performed by: PHYSICIAN ASSISTANT

## 2025-07-28 PROCEDURE — 3079F DIAST BP 80-89 MM HG: CPT | Performed by: PHYSICIAN ASSISTANT

## 2025-07-28 PROCEDURE — 83036 HEMOGLOBIN GLYCOSYLATED A1C: CPT | Performed by: PHYSICIAN ASSISTANT

## 2025-07-28 ASSESSMENT — PATIENT HEALTH QUESTIONNAIRE - PHQ9
1. LITTLE INTEREST OR PLEASURE IN DOING THINGS: NOT AT ALL
SUM OF ALL RESPONSES TO PHQ9 QUESTIONS 1 AND 2: 0
2. FEELING DOWN, DEPRESSED OR HOPELESS: NOT AT ALL

## 2025-07-28 ASSESSMENT — PAIN SCALES - GENERAL: PAINLEVEL_OUTOF10: 0-NO PAIN

## 2025-07-28 NOTE — PROGRESS NOTES
"Subjective   Patient ID: Calli Rodarte is a 60 y.o. female who presents for DM Follow up.    Christina is seen for follow up-    Hx of DM currently diet controlled. Was on metformin for a few years though discontinued 3 months ago due to possible malabsorption with XR formulation. She has had a good diet and some interval weight loss since discontinuing.   Only other complaint is L 4th trigger finger for about a month. Does have numbness in her hands as well. Is a  and has frequent repetitive movements.     HTN - has been stable; BP mildly elevated on arrival today though she admits some nerves- denies CP, SOB, edema.            Review of Systems   All other systems reviewed and are negative.      Objective   /82   Pulse 85   Ht 1.626 m (5' 4\")   Wt 64.4 kg (142 lb)   SpO2 99%   BMI 24.37 kg/m²     Physical Exam  Constitutional:       Appearance: Normal appearance.     Cardiovascular:      Rate and Rhythm: Normal rate and regular rhythm.     Musculoskeletal:      Comments: L 4th digit nodularity to flexor tendon, tender to base at palmar surface.     Neurological:      Mental Status: Calli is alert.         Assessment/Plan   Diagnoses and all orders for this visit:  Type 2 diabetes mellitus without complication, without long-term current use of insulin  -     POCT glycosylated hemoglobin (Hb A1C) manually resulted  Trigger ring finger of left hand    A1C is stable and in normal range. Recommend continued healthy diet and exercise.   Splint finger for 2 weeks - discussed alternate treatment options if this continues including CSI, taping.  Can schedule injection here or with ortho if not responsive to conservative tx. Otherwise continue routine follow up PCP as scheduled.      "

## (undated) DEVICE — TRAY, DRY PREP, PREMIUM

## (undated) DEVICE — DRESSING, NON-ADHERENT, 3 X 3 IN, STERILE

## (undated) DEVICE — PENCIL, ELECTROSURG, W/BUTTON SWITCH & HOLSTER, EZ CLEAN, DISP

## (undated) DEVICE — TUBING, SUCTION, 6MM X 10, CLEAN N-COND

## (undated) DEVICE — SOLUTION, IRRIGATION, X RX SODIUM CHL 0.9%, 1000ML BTL

## (undated) DEVICE — Device

## (undated) DEVICE — BANDAGE, ELASTIC, MATRIX, SELF-CLOSURE, 2 IN X 5 YD, LF

## (undated) DEVICE — SUTURE, PROLENE, 3-0, 18 IN, PS2, BLUE

## (undated) DEVICE — NEEDLE, ELECTRODE, ELECTROSURGICAL, INSULATED

## (undated) DEVICE — CUFF, TOURNIQUET, 18 X 4, DUAL PORT/SNGL BLADDER, DISP, LF

## (undated) DEVICE — BLADE, OPHTHALMIC, BEAVER, LAMELLAR, BEVEL UP, SHARP ALL AROUND, 60 DEG

## (undated) DEVICE — BLANKET, LOWER BODY, VHA PLUS, ADULT

## (undated) DEVICE — SPONGE GAUZE, XRAY SC+RFID, 4X4 16 PLY, STERILE

## (undated) DEVICE — GLOVE, SURGICAL, PROTEXIS PI , 7.5, PF, LF

## (undated) DEVICE — SOLUTION, CHLORHEXIDINE, 4%, 4OZ

## (undated) DEVICE — PADDING, CAST, SYNTHETC, 3 IN X 4 YD

## (undated) DEVICE — TUBING, CORRUGATED, SMOOTH BORE, 6 FT